# Patient Record
Sex: MALE | Race: WHITE | HISPANIC OR LATINO | Employment: FULL TIME | URBAN - METROPOLITAN AREA
[De-identification: names, ages, dates, MRNs, and addresses within clinical notes are randomized per-mention and may not be internally consistent; named-entity substitution may affect disease eponyms.]

---

## 2020-03-19 ENCOUNTER — TELEPHONE (OUTPATIENT)
Dept: SCHEDULING | Facility: IMAGING CENTER | Age: 50
End: 2020-03-19

## 2020-03-20 ENCOUNTER — OFFICE VISIT (OUTPATIENT)
Dept: MEDICAL GROUP | Facility: PHYSICIAN GROUP | Age: 50
End: 2020-03-20
Payer: COMMERCIAL

## 2020-03-20 VITALS
BODY MASS INDEX: 33.6 KG/M2 | HEART RATE: 88 BPM | SYSTOLIC BLOOD PRESSURE: 130 MMHG | RESPIRATION RATE: 16 BRPM | DIASTOLIC BLOOD PRESSURE: 82 MMHG | TEMPERATURE: 98.1 F | HEIGHT: 64 IN | WEIGHT: 196.8 LBS | OXYGEN SATURATION: 95 %

## 2020-03-20 DIAGNOSIS — E66.09 CLASS 1 OBESITY DUE TO EXCESS CALORIES WITHOUT SERIOUS COMORBIDITY WITH BODY MASS INDEX (BMI) OF 33.0 TO 33.9 IN ADULT: ICD-10-CM

## 2020-03-20 DIAGNOSIS — G89.29 CHRONIC RIGHT SHOULDER PAIN: ICD-10-CM

## 2020-03-20 DIAGNOSIS — L91.8 SKIN TAGS, MULTIPLE ACQUIRED: ICD-10-CM

## 2020-03-20 DIAGNOSIS — M25.511 CHRONIC RIGHT SHOULDER PAIN: ICD-10-CM

## 2020-03-20 PROBLEM — E66.811 CLASS 1 OBESITY DUE TO EXCESS CALORIES WITHOUT SERIOUS COMORBIDITY IN ADULT: Status: ACTIVE | Noted: 2020-03-20

## 2020-03-20 PROCEDURE — 99204 OFFICE O/P NEW MOD 45 MIN: CPT | Performed by: FAMILY MEDICINE

## 2020-03-20 RX ORDER — MELOXICAM 7.5 MG/1
7.5 TABLET ORAL DAILY
Qty: 30 TAB | Refills: 0 | Status: SHIPPED | OUTPATIENT
Start: 2020-03-20 | End: 2020-06-29

## 2020-03-20 ASSESSMENT — PATIENT HEALTH QUESTIONNAIRE - PHQ9: CLINICAL INTERPRETATION OF PHQ2 SCORE: 0

## 2020-03-20 NOTE — ASSESSMENT & PLAN NOTE
This is a chronic problem. The patient has a BMI of 33. Patient is aware of this problem. Lifestyle modification has not been embraced. The patient's diet is improving. The patient is not active.

## 2020-03-20 NOTE — ASSESSMENT & PLAN NOTE
This is a chronic problem.  The patient has developed multiple skin tags around the neck. These are bothersome to him and would like them removed.

## 2020-03-20 NOTE — PROGRESS NOTES
Subjective:     CC:  Diagnoses of Class 1 obesity due to excess calories without serious comorbidity with body mass index (BMI) of 33.0 to 33.9 in adult, Skin tags, multiple acquired, and Chronic right shoulder pain were pertinent to this visit.    HISTORY OF THE PRESENT ILLNESS: Patient is a 49 y.o. male. This pleasant patient is here today to establish care and discuss the following problems.   Prior PCP: Unknown.    Class 1 obesity due to excess calories without serious comorbidity in adult  This is a chronic problem. The patient has a BMI of 33. Patient is aware of this problem. Lifestyle modification has not been embraced. The patient's diet is improving. The patient is not active.     Skin tags, multiple acquired  This is a chronic problem.  The patient has developed multiple skin tags around the neck. These are bothersome to him and would like them removed.    Chronic right shoulder pain  This is a chronic problem  The patient has been having right shoulder pain for the past 1 year. Occurred after doing cross fit and lifting weights.   Pain is rated  8/10      Allergies: Patient has no known allergies.    Current Outpatient Medications Ordered in Epic   Medication Sig Dispense Refill   • meloxicam (MOBIC) 7.5 MG Tab Take 1 Tab by mouth every day. 30 Tab 0     No current Epic-ordered facility-administered medications on file.        Past Medical History:   Diagnosis Date   • Obesity        History reviewed. No pertinent surgical history.    Social History     Tobacco Use   • Smoking status: Never Smoker   • Smokeless tobacco: Never Used   Substance Use Topics   • Alcohol use: Not Currently   • Drug use: Never       Social History     Social History Narrative   • Not on file       Family History   Problem Relation Age of Onset   • Heart Disease Mother    • Hyperlipidemia Father    • Diabetes Father        Health Maintenance: Completed    ROS:   Gen: no fevers/chills, no changes in weight  Eyes: no changes in  "vision  ENT: no sore throat, no hearing loss, no bloody nose  Pulm: no sob, no cough  CV: no chest pain, no palpitations  GI: no nausea/vomiting, no diarrhea  : no dysuria  MSk: no myalgias  Skin: no rash  Neuro: no headaches, no numbness/tingling      Objective:     Exam: /82 (BP Location: Left arm, Patient Position: Sitting, BP Cuff Size: Adult)   Pulse 88   Temp 36.7 °C (98.1 °F) (Temporal)   Resp 16   Ht 1.626 m (5' 4\")   Wt 89.3 kg (196 lb 12.8 oz)   SpO2 95%  Body mass index is 33.78 kg/m².    General: Normal appearing. No distress.  HENT: Normocephalic. Ears normal shape and contour, canals are clear bilaterally, tympanic membranes are benign, nasal mucosa benign, oropharynx is without erythema, edema or exudates.   Eyes: Conjunctiva clear lids without ptosis, pupils equal and reactive to light accommodation.  Neck: Supple without JVD. Thyroid is not enlarged.  Pulmonary: Clear to ausculation.  Normal effort. No rales, ronchi, or wheezing.  Cardiovascular: Regular rate and rhythm without murmur. Radial pulses are intact and equal bilaterally.  Abdomen: Soft, nontender, nondistended. Normal bowel sounds. Liver and spleen are not palpable  Neurologic: Moves all extremities  Lymph: No cervical or supraclavicular lymph nodes are palpable  Skin: Warm and dry.  No obvious lesions.  Musculoskeletal: Normal gait. No extremity cyanosis, clubbing, or edema.  Right shoulder examination had a negative Howard test, negative empty can test, negative takeoff test.  Range of motion is within normal limits.  No pain is elicited upon shoulder extension.    Psych: Normal mood and affect. Alert and oriented x3.     Assessment & Plan:   49 y.o. male with the following -    1. Class 1 obesity due to excess calories without serious comorbidity with body mass index (BMI) of 33.0 to 33.9 in adult  This is a chronic, unchanged condition.  The patient is noted to have class I obesity with a BMI of 33.  He has not " reinforced lifestyle modification lately.  He has enjoyed CrossFit in the past.  I would like to risk stratify him by ordering baseline labs today.  - CBC WITHOUT DIFFERENTIAL; Future  - Comp Metabolic Panel; Future  - HEMOGLOBIN A1C; Future  - Lipid Profile; Future  - TSH WITH REFLEX TO FT4; Future    2. Skin tags, multiple acquired  There is a chronic, unchanged condition.  The patient is noting multiple skin tags around his neck.  He would like to get these removed.  I will send him to dermatology for removal.  - REFERRAL TO DERMATOLOGY    3. Chronic right shoulder pain  There is a chronic, stable condition.  The patient has been experiencing right shoulder pain of 1 year duration.  Physical exam is benign.  I suspect osteoarthritis.  I will do a trial of meloxicam along with physical therapy.  - REFERRAL TO PHYSICAL THERAPY Reason for Therapy: Eval/Treat/Report  - meloxicam (MOBIC) 7.5 MG Tab; Take 1 Tab by mouth every day.  Dispense: 30 Tab; Refill: 0      Return in about 6 months (around 9/20/2020) for FU on labs/weight.    Please note that this dictation was created using voice recognition software. I have made every reasonable attempt to correct obvious errors, but I expect that there are errors of grammar and possibly content that I did not discover before finalizing the note.

## 2020-03-20 NOTE — ASSESSMENT & PLAN NOTE
This is a chronic problem  The patient has been having right shoulder pain for the past 1 year. Occurred after doing cross fit and lifting weights.   Pain is rated  8/10

## 2020-03-21 ENCOUNTER — HOSPITAL ENCOUNTER (OUTPATIENT)
Dept: LAB | Facility: MEDICAL CENTER | Age: 50
End: 2020-03-21
Attending: FAMILY MEDICINE
Payer: COMMERCIAL

## 2020-03-21 DIAGNOSIS — E66.09 CLASS 1 OBESITY DUE TO EXCESS CALORIES WITHOUT SERIOUS COMORBIDITY WITH BODY MASS INDEX (BMI) OF 33.0 TO 33.9 IN ADULT: ICD-10-CM

## 2020-03-21 LAB
ALBUMIN SERPL BCP-MCNC: 4.4 G/DL (ref 3.2–4.9)
ALBUMIN/GLOB SERPL: 1.5 G/DL
ALP SERPL-CCNC: 90 U/L (ref 30–99)
ALT SERPL-CCNC: 42 U/L (ref 2–50)
ANION GAP SERPL CALC-SCNC: 11 MMOL/L (ref 7–16)
AST SERPL-CCNC: 24 U/L (ref 12–45)
BILIRUB SERPL-MCNC: 0.5 MG/DL (ref 0.1–1.5)
BUN SERPL-MCNC: 14 MG/DL (ref 8–22)
CALCIUM SERPL-MCNC: 9.4 MG/DL (ref 8.5–10.5)
CHLORIDE SERPL-SCNC: 103 MMOL/L (ref 96–112)
CHOLEST SERPL-MCNC: 213 MG/DL (ref 100–199)
CO2 SERPL-SCNC: 25 MMOL/L (ref 20–33)
CREAT SERPL-MCNC: 0.82 MG/DL (ref 0.5–1.4)
ERYTHROCYTE [DISTWIDTH] IN BLOOD BY AUTOMATED COUNT: 44.5 FL (ref 35.9–50)
EST. AVERAGE GLUCOSE BLD GHB EST-MCNC: 120 MG/DL
FASTING STATUS PATIENT QL REPORTED: NORMAL
GLOBULIN SER CALC-MCNC: 3 G/DL (ref 1.9–3.5)
GLUCOSE SERPL-MCNC: 94 MG/DL (ref 65–99)
HBA1C MFR BLD: 5.8 % (ref 0–5.6)
HCT VFR BLD AUTO: 53.2 % (ref 42–52)
HDLC SERPL-MCNC: 35 MG/DL
HGB BLD-MCNC: 17.7 G/DL (ref 14–18)
LDLC SERPL CALC-MCNC: 140 MG/DL
MCH RBC QN AUTO: 28.8 PG (ref 27–33)
MCHC RBC AUTO-ENTMCNC: 33.3 G/DL (ref 33.7–35.3)
MCV RBC AUTO: 86.6 FL (ref 81.4–97.8)
PLATELET # BLD AUTO: 326 K/UL (ref 164–446)
PMV BLD AUTO: 9.8 FL (ref 9–12.9)
POTASSIUM SERPL-SCNC: 4.1 MMOL/L (ref 3.6–5.5)
PROT SERPL-MCNC: 7.4 G/DL (ref 6–8.2)
RBC # BLD AUTO: 6.14 M/UL (ref 4.7–6.1)
SODIUM SERPL-SCNC: 139 MMOL/L (ref 135–145)
TRIGL SERPL-MCNC: 190 MG/DL (ref 0–149)
TSH SERPL DL<=0.005 MIU/L-ACNC: 1.45 UIU/ML (ref 0.38–5.33)
WBC # BLD AUTO: 9.5 K/UL (ref 4.8–10.8)

## 2020-03-21 PROCEDURE — 85027 COMPLETE CBC AUTOMATED: CPT

## 2020-03-21 PROCEDURE — 80061 LIPID PANEL: CPT

## 2020-03-21 PROCEDURE — 36415 COLL VENOUS BLD VENIPUNCTURE: CPT

## 2020-03-21 PROCEDURE — 83036 HEMOGLOBIN GLYCOSYLATED A1C: CPT

## 2020-03-21 PROCEDURE — 80053 COMPREHEN METABOLIC PANEL: CPT

## 2020-03-21 PROCEDURE — 84443 ASSAY THYROID STIM HORMONE: CPT

## 2020-03-30 ENCOUNTER — APPOINTMENT (OUTPATIENT)
Dept: PHYSICAL THERAPY | Facility: REHABILITATION | Age: 50
End: 2020-03-30
Attending: FAMILY MEDICINE
Payer: COMMERCIAL

## 2020-06-29 ENCOUNTER — APPOINTMENT (OUTPATIENT)
Dept: RADIOLOGY | Facility: IMAGING CENTER | Age: 50
End: 2020-06-29
Attending: FAMILY MEDICINE
Payer: COMMERCIAL

## 2020-06-29 ENCOUNTER — OFFICE VISIT (OUTPATIENT)
Dept: URGENT CARE | Facility: CLINIC | Age: 50
End: 2020-06-29
Payer: COMMERCIAL

## 2020-06-29 VITALS
HEIGHT: 64 IN | HEART RATE: 78 BPM | BODY MASS INDEX: 33.8 KG/M2 | SYSTOLIC BLOOD PRESSURE: 120 MMHG | RESPIRATION RATE: 20 BRPM | OXYGEN SATURATION: 93 % | TEMPERATURE: 98 F | WEIGHT: 198 LBS | DIASTOLIC BLOOD PRESSURE: 84 MMHG

## 2020-06-29 DIAGNOSIS — S20.219A CONTUSION OF CHEST WALL, UNSPECIFIED LATERALITY, INITIAL ENCOUNTER: ICD-10-CM

## 2020-06-29 DIAGNOSIS — T07.XXXA MULTIPLE ABRASIONS: ICD-10-CM
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
## 2020-06-29 PROCEDURE — 99214 OFFICE O/P EST MOD 30 MIN: CPT | Mod: 25 | Performed by: FAMILY MEDICINE

## 2020-06-29 PROCEDURE — 71101 X-RAY EXAM UNILAT RIBS/CHEST: CPT | Mod: TC,LT | Performed by: FAMILY MEDICINE

## 2020-06-29 PROCEDURE — 90715 TDAP VACCINE 7 YRS/> IM: CPT | Performed by: FAMILY MEDICINE

## 2020-06-29 PROCEDURE — 90471 IMMUNIZATION ADMIN: CPT | Performed by: FAMILY MEDICINE

## 2020-06-29 SDOH — SOCIAL STABILITY: SOCIAL NETWORK
DO YOU BELONG TO ANY CLUBS OR ORGANIZATIONS SUCH AS CHURCH GROUPS UNIONS, FRATERNAL OR ATHLETIC GROUPS, OR SCHOOL GROUPS?: YES

## 2020-06-29 SDOH — ECONOMIC STABILITY: INCOME INSECURITY: HOW HARD IS IT FOR YOU TO PAY FOR THE VERY BASICS LIKE FOOD, HOUSING, MEDICAL CARE, AND HEATING?: NOT VERY HARD

## 2020-06-29 SDOH — SOCIAL STABILITY: SOCIAL NETWORK: HOW OFTEN DO YOU ATTENT MEETINGS OF THE CLUB OR ORGANIZATION YOU BELONG TO?: MORE THAN 4 TIMES PER YEAR

## 2020-06-29 SDOH — ECONOMIC STABILITY: TRANSPORTATION INSECURITY
IN THE PAST 12 MONTHS, HAS LACK OF TRANSPORTATION KEPT YOU FROM MEETINGS, WORK, OR FROM GETTING THINGS NEEDED FOR DAILY LIVING?: NO

## 2020-06-29 SDOH — HEALTH STABILITY: MENTAL HEALTH: HOW OFTEN DO YOU HAVE A DRINK CONTAINING ALCOHOL?: NEVER

## 2020-06-29 SDOH — ECONOMIC STABILITY: FOOD INSECURITY: WITHIN THE PAST 12 MONTHS, YOU WORRIED THAT YOUR FOOD WOULD RUN OUT BEFORE YOU GOT MONEY TO BUY MORE.: NEVER TRUE

## 2020-06-29 SDOH — SOCIAL STABILITY: SOCIAL NETWORK: ARE YOU MARRIED, WIDOWED, DIVORCED, SEPARATED, NEVER MARRIED, OR LIVING WITH A PARTNER?: MARRIED

## 2020-06-29 SDOH — HEALTH STABILITY: PHYSICAL HEALTH: ON AVERAGE, HOW MANY DAYS PER WEEK DO YOU ENGAGE IN MODERATE TO STRENUOUS EXERCISE (LIKE A BRISK WALK)?: 2 DAYS

## 2020-06-29 SDOH — HEALTH STABILITY: MENTAL HEALTH: HOW MANY STANDARD DRINKS CONTAINING ALCOHOL DO YOU HAVE ON A TYPICAL DAY?: PATIENT DECLINED

## 2020-06-29 SDOH — HEALTH STABILITY: PHYSICAL HEALTH: ON AVERAGE, HOW MANY MINUTES DO YOU ENGAGE IN EXERCISE AT THIS LEVEL?: 60 MIN

## 2020-06-29 SDOH — SOCIAL STABILITY: SOCIAL NETWORK: HOW OFTEN DO YOU ATTEND CHURCH OR RELIGIOUS SERVICES?: MORE THAN 4 TIMES PER YEAR

## 2020-06-29 SDOH — SOCIAL STABILITY: SOCIAL NETWORK
IN A TYPICAL WEEK, HOW MANY TIMES DO YOU TALK ON THE PHONE WITH FAMILY, FRIENDS, OR NEIGHBORS?: MORE THAN THREE TIMES A WEEK

## 2020-06-29 SDOH — HEALTH STABILITY: MENTAL HEALTH: HOW OFTEN DO YOU HAVE 6 OR MORE DRINKS ON ONE OCCASION?: NEVER

## 2020-06-29 SDOH — HEALTH STABILITY: MENTAL HEALTH
STRESS IS WHEN SOMEONE FEELS TENSE, NERVOUS, ANXIOUS, OR CAN'T SLEEP AT NIGHT BECAUSE THEIR MIND IS TROUBLED. HOW STRESSED ARE YOU?: NOT AT ALL

## 2020-06-29 SDOH — ECONOMIC STABILITY: FOOD INSECURITY: WITHIN THE PAST 12 MONTHS, THE FOOD YOU BOUGHT JUST DIDN'T LAST AND YOU DIDN'T HAVE MONEY TO GET MORE.: NEVER TRUE

## 2020-06-29 SDOH — ECONOMIC STABILITY: TRANSPORTATION INSECURITY
IN THE PAST 12 MONTHS, HAS THE LACK OF TRANSPORTATION KEPT YOU FROM MEDICAL APPOINTMENTS OR FROM GETTING MEDICATIONS?: NO

## 2020-06-29 SDOH — SOCIAL STABILITY: SOCIAL NETWORK: HOW OFTEN DO YOU GET TOGETHER WITH FRIENDS OR RELATIVES?: ONCE A WEEK

## 2020-06-29 ASSESSMENT — FIBROSIS 4 INDEX: FIB4 SCORE: 0.58
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
## 2020-09-21 ENCOUNTER — OFFICE VISIT (OUTPATIENT)
Dept: MEDICAL GROUP | Facility: PHYSICIAN GROUP | Age: 50
End: 2020-09-21
Payer: COMMERCIAL

## 2020-09-21 VITALS
HEART RATE: 72 BPM | DIASTOLIC BLOOD PRESSURE: 96 MMHG | WEIGHT: 200.8 LBS | BODY MASS INDEX: 34.28 KG/M2 | RESPIRATION RATE: 16 BRPM | TEMPERATURE: 97.7 F | HEIGHT: 64 IN | OXYGEN SATURATION: 97 % | SYSTOLIC BLOOD PRESSURE: 134 MMHG

## 2020-09-21 DIAGNOSIS — E66.09 CLASS 1 OBESITY DUE TO EXCESS CALORIES WITHOUT SERIOUS COMORBIDITY IN ADULT, UNSPECIFIED BMI: ICD-10-CM

## 2020-09-21 DIAGNOSIS — E78.2 MIXED HYPERLIPIDEMIA: ICD-10-CM

## 2020-09-21 DIAGNOSIS — R73.03 PREDIABETES: ICD-10-CM

## 2020-09-21 DIAGNOSIS — Z23 NEED FOR VACCINATION: ICD-10-CM

## 2020-09-21 PROCEDURE — 99214 OFFICE O/P EST MOD 30 MIN: CPT | Mod: 25 | Performed by: FAMILY MEDICINE

## 2020-09-21 PROCEDURE — 90471 IMMUNIZATION ADMIN: CPT | Performed by: FAMILY MEDICINE

## 2020-09-21 PROCEDURE — 90686 IIV4 VACC NO PRSV 0.5 ML IM: CPT | Performed by: FAMILY MEDICINE

## 2020-09-21 ASSESSMENT — FIBROSIS 4 INDEX: FIB4 SCORE: 0.58

## 2020-09-21 NOTE — ASSESSMENT & PLAN NOTE
This is a chronic problem. The patient has a BMI of 34. Patient is aware of this problem. Lifestyle modification has not been embraced. Gained 4lbs since 3/20. Has been more sedentary due to the pandemic. The patient's diet is low on meats but has high content in rice and potatoes.

## 2020-09-21 NOTE — ASSESSMENT & PLAN NOTE
This is a chronic problem  The patient was found to have elevated cholesterol values 3/20  The 10-year ASCVD risk score (Tim MARCIAL Jr., et al., 2013) is: 6.5%

## 2020-09-21 NOTE — PROGRESS NOTES
"Subjective:     Chief Complaint   Patient presents with   • Follow-Up       HPI:   Curly presents today to discuss the following.    Class 1 obesity due to excess calories without serious comorbidity in adult  This is a chronic problem. The patient has a BMI of 34. Patient is aware of this problem. Lifestyle modification has not been embraced. Gained 4lbs since 3/20. Has been more sedentary due to the pandemic. The patient's diet is low on meats but has high content in rice and potatoes.     Prediabetes  This is a chronic issue  The patient was found to have A1c of 5.8 back in 3/20  He would like to hold off until next visit so he can lose the weight.     Mixed hyperlipidemia  This is a chronic problem  The patient was found to have elevated cholesterol values 3/20  The 10-year ASCVD risk score ([place] ANIKET Jr., et al., 2013) is: 6.5%        Past Medical History:   Diagnosis Date   • Obesity        Social History     Tobacco Use   • Smoking status: Never Smoker   • Smokeless tobacco: Never Used   Substance Use Topics   • Alcohol use: Not Currently     Frequency: Never     Drinks per session: Patient refused     Binge frequency: Never   • Drug use: Never       No current [facility]-ordered outpatient medications on file.     No current [facility]-ordered facility-administered medications on file.        Allergies:  Patient has no known allergies.    Health Maintenance: Completed    ROS:  Gen: no fevers/chills, no changes in weight  Eyes: no changes in vision  ENT: no sore throat, no hearing loss, no bloody nose  Pulm: no sob, no cough  CV: no chest pain, no palpitations  GI: no nausea/vomiting, no diarrhea      Objective:     Exam:  /96 (BP Location: Left arm, Patient Position: Sitting, BP Cuff Size: Adult)   Pulse 72   Temp 36.5 °C (97.7 °F) (Temporal)   Resp 16   Ht 1.626 m (5' 4\")   Wt 91.1 kg (200 lb 12.8 oz)   SpO2 97%   BMI 34.47 kg/m²  Body mass index is 34.47 kg/m².    Constitutional: Alert, no distress, " well-groomed.  Skin: Warm, dry, good turgor, no rashes in visible areas.  Eye: Equal, round and reactive, conjunctiva clear, lids normal.  ENMT: Lips without lesions, good dentition, moist mucous membranes.  Neck: Trachea midline, no masses, no thyromegaly.  Respiratory: Unlabored respiratory effort, no cough.  MSK: Normal gait, moves all extremities.  Neuro: Grossly non-focal.   Psych: Alert and oriented x3, normal affect and mood.      Assessment & Plan:     49 y.o. male with the following -     1. Class 1 obesity due to excess calories without serious comorbidity in adult, unspecified BMI  This is a chronic condition, worsening.  Patient is noted to be worsening in terms of his condition and has gained approximately 4 pounds since March.  He attributes this to the sedentary lifestyle that he has had due to the pandemic.  I have reinforced lifestyle change today he is amenable to working harder on it.  We will reassess him in 6 months.    2. Prediabetes  This is a chronic condition.  The patient has a history of prediabetes last checked March 2020 with an A1c of 5.8.  I plan on reinforcing lifestyle change and checking again by March 2021.  - HEMOGLOBIN A1C; Future  - Lipid Profile; Future    3. Mixed hyperlipidemia  This is a chronic condition.  ASCVD score is 6.8%.  He will continue to reinforce lifestyle change.  No statin is indicated at this time.  - HEMOGLOBIN A1C; Future  - Lipid Profile; Future    4. Need for vaccination  - Influenza Vaccine Quad Injection (PF)      Return in about 6 months (around 3/21/2021).    Please note that this dictation was created using voice recognition software. I have made every reasonable attempt to correct obvious errors, but I expect that there are errors of grammar and possibly content that I did not discover before finalizing the note.

## 2020-09-21 NOTE — ASSESSMENT & PLAN NOTE
This is a chronic issue  The patient was found to have A1c of 5.8 back in 3/20  He would like to hold off until next visit so he can lose the weight.

## 2021-03-22 ENCOUNTER — APPOINTMENT (OUTPATIENT)
Dept: MEDICAL GROUP | Facility: PHYSICIAN GROUP | Age: 51
End: 2021-03-22
Payer: COMMERCIAL

## 2022-08-10 ENCOUNTER — OFFICE VISIT (OUTPATIENT)
Dept: MEDICAL GROUP | Facility: PHYSICIAN GROUP | Age: 52
End: 2022-08-10
Payer: COMMERCIAL

## 2022-08-10 VITALS
HEIGHT: 65 IN | BODY MASS INDEX: 32.99 KG/M2 | RESPIRATION RATE: 14 BRPM | DIASTOLIC BLOOD PRESSURE: 70 MMHG | WEIGHT: 198 LBS | SYSTOLIC BLOOD PRESSURE: 120 MMHG | OXYGEN SATURATION: 96 % | TEMPERATURE: 97.7 F | HEART RATE: 75 BPM

## 2022-08-10 DIAGNOSIS — Z13.21 ENCOUNTER FOR VITAMIN DEFICIENCY SCREENING: ICD-10-CM

## 2022-08-10 DIAGNOSIS — R73.03 PREDIABETES: ICD-10-CM

## 2022-08-10 DIAGNOSIS — E66.09 CLASS 1 OBESITY DUE TO EXCESS CALORIES WITHOUT SERIOUS COMORBIDITY IN ADULT, UNSPECIFIED BMI: ICD-10-CM

## 2022-08-10 DIAGNOSIS — E78.2 MIXED HYPERLIPIDEMIA: ICD-10-CM

## 2022-08-10 DIAGNOSIS — Z13.29 SCREENING FOR THYROID DISORDER: ICD-10-CM

## 2022-08-10 DIAGNOSIS — R03.0 ELEVATED BLOOD PRESSURE READING: ICD-10-CM

## 2022-08-10 DIAGNOSIS — E55.9 VITAMIN D DEFICIENCY: ICD-10-CM

## 2022-08-10 PROCEDURE — 99214 OFFICE O/P EST MOD 30 MIN: CPT | Performed by: INTERNAL MEDICINE

## 2022-08-10 ASSESSMENT — PATIENT HEALTH QUESTIONNAIRE - PHQ9: CLINICAL INTERPRETATION OF PHQ2 SCORE: 0

## 2022-08-10 NOTE — PROGRESS NOTES
"New Patient to establish    Chief Complaint   Patient presents with    [facility]    Requesting Labs       Subjective:     History of Present Illness: Patient is a 51 y.o. male who is here today to establish primary care    No current outpatient medications on file prior to visit.     No current facility-administered medications on file prior to visit.     No Known Allergies  Patient Active Problem List    Diagnosis Date Noted    Vitamin D deficiency 08/10/2022    Prediabetes 09/21/2020    Mixed hyperlipidemia 09/21/2020    Class 1 obesity due to excess calories without serious comorbidity in adult 03/20/2020    Skin tags, multiple acquired 03/20/2020    Chronic right shoulder pain 03/20/2020     Past Medical History:   Diagnosis Date    Obesity      No past surgical history on file.  Family History   Problem Relation Age of Onset    Heart Disease Mother     Hyperlipidemia Father     Diabetes Father      Social History     Tobacco Use    Smoking status: Never    Smokeless tobacco: Never   Vaping Use    Vaping Use: Never used   Substance Use Topics    Alcohol use: Not Currently    Drug use: Never       ROS:  All other systems reviewed and are negative except as stated in the HPI       Physical Exam:     /70 (BP Location: Left arm, Patient Position: Sitting, BP Cuff Size: Adult)   Pulse 75   Temp 36.5 °C (97.7 °F) (Temporal)   Resp 14   Ht 1.651 m (5' 5\")   Wt 89.8 kg (198 lb)   SpO2 96%   BMI 32.95 kg/m²   General: Normal appearing. No distress.  Pulmonary: Clear to ausculation.  Normal effort.   Cardiovascular: Regular rate and rhythm    Assessment and Plan:     1. Class 1 obesity due to excess calories without serious comorbidity in adult, unspecified BMI  Patient try to lose weight with more keto diet and time restricted feeding practice, reported achieve some weight loss as well, would like to be around 180 pounds  - Comp Metabolic Panel; Future  - FREE THYROXINE; Future  - T3 FREE; Future  - " THYROID PEROXIDASE  (TPO) AB; Future  - TSH; Future    2. Prediabetes  - HEMOGLOBIN A1C; Future    3. Mixed hyperlipidemia  - LipoFit by NMR; Future    4. Screening for thyroid disorder  - FREE THYROXINE; Future  - T3 FREE; Future  - THYROID PEROXIDASE  (TPO) AB; Future  - TSH; Future    5. Encounter for vitamin deficiency screening  - VITAMIN B12; Future    6. Vitamin D deficiency  - VITAMIN D,25 HYDROXY; Future    Elevated blood pressure reading  - Reported checking blood pressure at home with 1 reading of 150s over 90s, does not check blood pressure on regular basis, denies related symptoms currently    -discussion in details on target blood pressure goal, advised monitoring BP closely at home.  Have BP log to present at follow-up visit or send through my chart.   -Advised low-salt diet, healthy dietary option include plenty of vegetable, reduce refine carbohydrates and sugar, regular exercise as tolerated, healthy fat/protein/carbs, also avoid alcohol, no NSAIDs  If symptoms worsen or persist patient can return to clinic for reevaluation.  Red flags and strict emergency room precautions discussed.  Discussed side effects of medication. Patient understand    Follow Up:      Return in about 4 weeks (around 9/7/2022) for labs.    Please note that this dictation was created using voice recognition software. I have made every reasonable attempt to correct obvious errors, but I expect that there are errors of grammar and possibly content that I did not discover before finalizing the note.    Signed by: Vickey Hurd M.D.

## 2022-08-31 ENCOUNTER — HOSPITAL ENCOUNTER (OUTPATIENT)
Dept: LAB | Facility: MEDICAL CENTER | Age: 52
End: 2022-08-31
Attending: INTERNAL MEDICINE
Payer: COMMERCIAL

## 2022-08-31 DIAGNOSIS — R73.03 PREDIABETES: ICD-10-CM

## 2022-08-31 DIAGNOSIS — E78.2 MIXED HYPERLIPIDEMIA: ICD-10-CM

## 2022-08-31 DIAGNOSIS — Z13.29 SCREENING FOR THYROID DISORDER: ICD-10-CM

## 2022-08-31 DIAGNOSIS — E66.09 CLASS 1 OBESITY DUE TO EXCESS CALORIES WITHOUT SERIOUS COMORBIDITY IN ADULT, UNSPECIFIED BMI: ICD-10-CM

## 2022-08-31 DIAGNOSIS — E55.9 VITAMIN D DEFICIENCY: ICD-10-CM

## 2022-08-31 DIAGNOSIS — Z13.21 ENCOUNTER FOR VITAMIN DEFICIENCY SCREENING: ICD-10-CM

## 2022-08-31 LAB
25(OH)D3 SERPL-MCNC: 18 NG/ML (ref 30–100)
EST. AVERAGE GLUCOSE BLD GHB EST-MCNC: 123 MG/DL
HBA1C MFR BLD: 5.9 % (ref 4–5.6)
T3FREE SERPL-MCNC: 3.21 PG/ML (ref 2–4.4)
T4 FREE SERPL-MCNC: 0.91 NG/DL (ref 0.93–1.7)
THYROPEROXIDASE AB SERPL-ACNC: <9 IU/ML (ref 0–9)
TSH SERPL DL<=0.005 MIU/L-ACNC: 2.1 UIU/ML (ref 0.38–5.33)
VIT B12 SERPL-MCNC: 587 PG/ML (ref 211–911)

## 2022-08-31 PROCEDURE — 82306 VITAMIN D 25 HYDROXY: CPT

## 2022-08-31 PROCEDURE — 83704 LIPOPROTEIN BLD QUAN PART: CPT

## 2022-08-31 PROCEDURE — 86376 MICROSOMAL ANTIBODY EACH: CPT

## 2022-08-31 PROCEDURE — 82607 VITAMIN B-12: CPT

## 2022-08-31 PROCEDURE — 80061 LIPID PANEL: CPT

## 2022-08-31 PROCEDURE — 83036 HEMOGLOBIN GLYCOSYLATED A1C: CPT

## 2022-08-31 PROCEDURE — 84439 ASSAY OF FREE THYROXINE: CPT

## 2022-08-31 PROCEDURE — 84481 FREE ASSAY (FT-3): CPT

## 2022-08-31 PROCEDURE — 80053 COMPREHEN METABOLIC PANEL: CPT

## 2022-08-31 PROCEDURE — 36415 COLL VENOUS BLD VENIPUNCTURE: CPT

## 2022-08-31 PROCEDURE — 84443 ASSAY THYROID STIM HORMONE: CPT

## 2022-09-01 LAB
ALBUMIN SERPL BCP-MCNC: 4.6 G/DL (ref 3.2–4.9)
ALBUMIN/GLOB SERPL: 1.6 G/DL
ALP SERPL-CCNC: 85 U/L (ref 30–99)
ALT SERPL-CCNC: 24 U/L (ref 2–50)
ANION GAP SERPL CALC-SCNC: 12 MMOL/L (ref 7–16)
AST SERPL-CCNC: 19 U/L (ref 12–45)
BILIRUB SERPL-MCNC: 0.6 MG/DL (ref 0.1–1.5)
BUN SERPL-MCNC: 13 MG/DL (ref 8–22)
CALCIUM SERPL-MCNC: 9.2 MG/DL (ref 8.5–10.5)
CHLORIDE SERPL-SCNC: 103 MMOL/L (ref 96–112)
CO2 SERPL-SCNC: 23 MMOL/L (ref 20–33)
CREAT SERPL-MCNC: 0.84 MG/DL (ref 0.5–1.4)
GFR SERPLBLD CREATININE-BSD FMLA CKD-EPI: 104 ML/MIN/1.73 M 2
GLOBULIN SER CALC-MCNC: 2.8 G/DL (ref 1.9–3.5)
GLUCOSE SERPL-MCNC: 81 MG/DL (ref 65–99)
POTASSIUM SERPL-SCNC: 3.9 MMOL/L (ref 3.6–5.5)
PROT SERPL-MCNC: 7.4 G/DL (ref 6–8.2)
SODIUM SERPL-SCNC: 138 MMOL/L (ref 135–145)

## 2022-09-04 LAB
CHOLEST SERPL-MCNC: 234 MG/DL
HDL PARTICAL NO Q4363: 28.4 UMOL/L
HDL SIZE Q4361: 8.2 NM
HDLC SERPL-MCNC: 33 MG/DL (ref 40–59)
HLD.LARGE SERPL-SCNC: <2.8 UMOL/L
L VLDL PART NO Q4357: 8.4 NMOL/L
LDL SERPL QN: 19.8 NM
LDL SERPL-SCNC: 2366 NMOL/L
LDL SMALL SERPL-SCNC: >1085 NMOL/L
LDLC SERPL CALC-MCNC: 151 MG/DL
PATHOLOGY STUDY: ABNORMAL
TRIGL SERPL-MCNC: 251 MG/DL (ref 30–149)
VLDL SIZE Q4362: 53.6 NM

## 2022-10-02 ENCOUNTER — HOSPITAL ENCOUNTER (OUTPATIENT)
Facility: MEDICAL CENTER | Age: 52
End: 2022-10-02
Attending: FAMILY MEDICINE
Payer: COMMERCIAL

## 2022-10-02 ENCOUNTER — OFFICE VISIT (OUTPATIENT)
Dept: URGENT CARE | Facility: PHYSICIAN GROUP | Age: 52
End: 2022-10-02
Payer: COMMERCIAL

## 2022-10-02 VITALS
HEART RATE: 85 BPM | SYSTOLIC BLOOD PRESSURE: 132 MMHG | WEIGHT: 197 LBS | HEIGHT: 65 IN | DIASTOLIC BLOOD PRESSURE: 88 MMHG | OXYGEN SATURATION: 96 % | TEMPERATURE: 98.8 F | BODY MASS INDEX: 32.82 KG/M2 | RESPIRATION RATE: 20 BRPM

## 2022-10-02 DIAGNOSIS — J02.9 SORE THROAT: ICD-10-CM

## 2022-10-02 DIAGNOSIS — Z03.818 ENCOUNTER FOR PATIENT CONCERN ABOUT EXPOSURE TO INFECTIOUS ORGANISM: ICD-10-CM

## 2022-10-02 LAB
COVID ORDER STATUS COVID19: NORMAL
INT CON NEG: NORMAL
INT CON POS: NORMAL
S PYO AG THROAT QL: NEGATIVE

## 2022-10-02 PROCEDURE — U0003 INFECTIOUS AGENT DETECTION BY NUCLEIC ACID (DNA OR RNA); SEVERE ACUTE RESPIRATORY SYNDROME CORONAVIRUS 2 (SARS-COV-2) (CORONAVIRUS DISEASE [COVID-19]), AMPLIFIED PROBE TECHNIQUE, MAKING USE OF HIGH THROUGHPUT TECHNOLOGIES AS DESCRIBED BY CMS-2020-01-R: HCPCS

## 2022-10-02 PROCEDURE — 87880 STREP A ASSAY W/OPTIC: CPT | Performed by: FAMILY MEDICINE

## 2022-10-02 PROCEDURE — U0005 INFEC AGEN DETEC AMPLI PROBE: HCPCS

## 2022-10-02 PROCEDURE — 99213 OFFICE O/P EST LOW 20 MIN: CPT | Performed by: FAMILY MEDICINE

## 2022-10-02 NOTE — LETTER
October 2, 2022    To Whom It May Concern:         This is confirmation that Curly Cohenshruthi attended his scheduled appointment with Vesta Paredes M.D. on 10/02/22. COVID testing is in progress. Please allow up to 72 hours for results.         If you have any questions please do not hesitate to call me at the phone number listed below.    Sincerely,          Vesta Paredes M.D.  [phone]

## 2022-10-02 NOTE — PROGRESS NOTES
"  Subjective:      51 y.o. male presents to urgent care for cold symptoms that started on Friday.  He is experiencing sore throat, congestion, fatigue, and cough.  No fever, body aches, or headaches.  He has been using Tylenol with moderate relief in symptoms.  He denies any tobacco product use.  No history of asthma or COPD.  He is fully vaccinated against COVID.  No known sick contacts.    He denies any other questions or concerns at this time.    Current problem list, medication, and past medical/surgical history were reviewed in Epic.    ROS  See HPI     Objective:      /88 (BP Location: Right arm, Patient Position: Sitting, BP Cuff Size: Adult)   Pulse 85   Temp 37.1 °C (98.8 °F) (Temporal)   Resp 20   Ht 1.651 m (5' 5\")   Wt 89.4 kg (197 lb)   SpO2 96%   BMI 32.78 kg/m²     Physical Exam  Constitutional:       General: He is not in acute distress.     Appearance: He is not diaphoretic.   HENT:      Right Ear: Tympanic membrane, ear canal and external ear normal.      Left Ear: Tympanic membrane, ear canal and external ear normal.      Mouth/Throat:      Tongue: Tongue does not deviate from midline.      Palate: No lesions.      Pharynx: Uvula midline. Posterior oropharyngeal erythema present.      Tonsils: No tonsillar exudate. 1+ on the right. 1+ on the left.   Cardiovascular:      Rate and Rhythm: Normal rate and regular rhythm.      Heart sounds: Normal heart sounds.   Pulmonary:      Effort: Pulmonary effort is normal. No respiratory distress.      Breath sounds: Normal breath sounds.   Neurological:      Mental Status: He is alert.   Psychiatric:         Mood and Affect: Affect normal.         Judgment: Judgment normal.     Assessment/Plan:     1. Encounter for patient concern about exposure to infectious organism  2. Sore throat  Rapid strep negative. Testing performed for COVID-19. In the meantime patient was advised to isolate until COVID test results returned. I encouraged mask use, " frequent handwashing, wiping down hard surfaces, etc. Tylenol and Ibuprofen were recommended for symptomatic relief. If positive they will be contacted by their local health department regarding return to work/school protocols. Patient is currently without indication of need for higher level of care. Patient/Guardian was given precautionary signs/symptoms that mandate immediate follow up and evaluation in the ED. The patient and/or guardian demonstrated a good understanding and agreed with the treatment plan.  - POCT Rapid Strep A  - SARS-CoV-2 PCR (24 hour In-House): Collect NP swab in VTM; Future      Instructed to return to Urgent Care or nearest Emergency Department if symptoms fail to improve, for any change in condition, further concerns, or new concerning symptoms. Patient states understanding of the plan of care and discharge instructions.    Vesta Paredes M.D.

## 2022-10-03 LAB
SARS-COV-2 RNA RESP QL NAA+PROBE: NOTDETECTED
SPECIMEN SOURCE: NORMAL

## 2022-10-27 ENCOUNTER — OFFICE VISIT (OUTPATIENT)
Dept: MEDICAL GROUP | Facility: PHYSICIAN GROUP | Age: 52
End: 2022-10-27
Payer: COMMERCIAL

## 2022-10-27 VITALS
WEIGHT: 199 LBS | OXYGEN SATURATION: 93 % | HEIGHT: 65 IN | RESPIRATION RATE: 14 BRPM | TEMPERATURE: 97.1 F | HEART RATE: 84 BPM | BODY MASS INDEX: 33.15 KG/M2 | DIASTOLIC BLOOD PRESSURE: 82 MMHG | SYSTOLIC BLOOD PRESSURE: 140 MMHG

## 2022-10-27 DIAGNOSIS — R73.03 PREDIABETES: ICD-10-CM

## 2022-10-27 DIAGNOSIS — E78.2 MIXED HYPERLIPIDEMIA: ICD-10-CM

## 2022-10-27 DIAGNOSIS — E66.09 CLASS 1 OBESITY DUE TO EXCESS CALORIES WITHOUT SERIOUS COMORBIDITY IN ADULT, UNSPECIFIED BMI: ICD-10-CM

## 2022-10-27 DIAGNOSIS — E55.9 VITAMIN D DEFICIENCY: ICD-10-CM

## 2022-10-27 PROCEDURE — 99214 OFFICE O/P EST MOD 30 MIN: CPT | Performed by: INTERNAL MEDICINE

## 2022-10-27 NOTE — PROGRESS NOTES
"Established Patient    Chief Complaint   Patient presents with    Follow-Up    Lab Results       Subjective:     HPI:   Curly presents today with the following.    Patient Active Problem List    Diagnosis Date Noted    Vitamin D deficiency 08/10/2022    Elevated blood pressure reading 08/10/2022    Prediabetes 09/21/2020    Mixed hyperlipidemia 09/21/2020    Class 1 obesity due to excess calories without serious comorbidity in adult 03/20/2020    Skin tags, multiple acquired 03/20/2020    Chronic right shoulder pain 03/20/2020       No current outpatient medications on file prior to visit.     No current facility-administered medications on file prior to visit.       Allergies, past medical history, past surgical history, family history, social history reviewed and updated    ROS:  All other systems reviewed and are negative except as stated in the HPI       Physical Exam:     BP (!) 140/82 (BP Location: Left arm, Patient Position: Sitting, BP Cuff Size: Adult)   Pulse 84   Temp 36.2 °C (97.1 °F) (Temporal)   Resp 14   Ht 1.651 m (5' 5\")   Wt 90.3 kg (199 lb)   SpO2 93%   BMI 33.12 kg/m²   General: Normal appearing. No distress.  Pulmonary: Clear to ausculation.  Normal effort.   Cardiovascular: Regular rate and rhythm    Assessment and Plan:     51 y.o. male with the following issues.    1. Class 1 obesity due to excess calories without serious comorbidity in adult, unspecified BMI    2. Prediabetes    3. Mixed hyperlipidemia    4. Vitamin D deficiency  - Cholecalciferol 54166 UNIT Cap; Take 1 Capsule by mouth every 7 days.  Dispense: 16 Capsule; Refill: 0    Patient had lab with vitamin D deficiency, abnormal cholesterol including high triglyceride, low HDL, elevated LDL, as well as prediabetes range of A1c, also has some weight issues as well, patient denies smoking, denied having cardiovascular diseases    -Lengthy discussion regarding healthy dietary options including plenty of vegetable, reduce refine " carb's and sugar, , regular exercise as tolerated, healthy fat/protein/carbs  - Shown pictures regarding healthy lifestyle changes and healthy dietary options  -Patient would like to start these lifestyle changes to improve obesity/overweight as well as other chronic conditions    Follow Up:      Return in about 4 weeks (around 11/24/2022).    Please note that this dictation was created using voice recognition software. I have made every reasonable attempt to correct obvious errors, but I expect that there are errors of grammar and possibly content that I did not discover before finalizing the note.    Signed by: Vickey Hurd M.D.

## 2022-10-27 NOTE — PATIENT INSTRUCTIONS
"Cholesterol problem treatment:  # Bergamot 500 - 1000 mg daily  #Berberine: Take 500 mg twice a day  # Resveratrol ( berries, dark chocolate, unsweetened cocoa, 90% or  above)   Garlic, onion, leeks   Probiotics:  billions  Fermented foods= sauerkraut, yogurt \" plain, organic\", kefir, apple cider vinegar with mother, kimchi >> organic   Nuts and Seeds \" also myron and Flax seeds\" >> unsalted, un-roasted, raw, organic  Fish oil (cod liver oil), flaxseed oil, olive oil ( extra virgin, cold pressed)  Turmeric, jose, black pepper, Cinnamon combination supplement   Green Tea Matcha Organic >>2-3 cups in day    "

## 2022-11-01 ENCOUNTER — RESEARCH ENCOUNTER (OUTPATIENT)
Dept: MEDICAL GROUP | Facility: PHYSICIAN GROUP | Age: 52
End: 2022-11-01
Payer: COMMERCIAL

## 2022-11-01 DIAGNOSIS — Z00.6 RESEARCH STUDY PATIENT: ICD-10-CM

## 2022-12-21 ENCOUNTER — OFFICE VISIT (OUTPATIENT)
Dept: MEDICAL GROUP | Facility: PHYSICIAN GROUP | Age: 52
End: 2022-12-21
Payer: COMMERCIAL

## 2022-12-21 VITALS
TEMPERATURE: 97.7 F | BODY MASS INDEX: 33.99 KG/M2 | WEIGHT: 204 LBS | OXYGEN SATURATION: 95 % | HEIGHT: 65 IN | HEART RATE: 89 BPM | DIASTOLIC BLOOD PRESSURE: 80 MMHG | RESPIRATION RATE: 14 BRPM | SYSTOLIC BLOOD PRESSURE: 140 MMHG

## 2022-12-21 DIAGNOSIS — E88.810 METABOLIC SYNDROME: ICD-10-CM

## 2022-12-21 DIAGNOSIS — E66.09 CLASS 1 OBESITY DUE TO EXCESS CALORIES WITHOUT SERIOUS COMORBIDITY IN ADULT, UNSPECIFIED BMI: ICD-10-CM

## 2022-12-21 DIAGNOSIS — L91.8 SKIN TAGS, MULTIPLE ACQUIRED: ICD-10-CM

## 2022-12-21 DIAGNOSIS — E78.2 MIXED HYPERLIPIDEMIA: ICD-10-CM

## 2022-12-21 DIAGNOSIS — R73.03 PREDIABETES: ICD-10-CM

## 2022-12-21 DIAGNOSIS — R03.0 ELEVATED BLOOD PRESSURE READING: ICD-10-CM

## 2022-12-21 PROCEDURE — 99214 OFFICE O/P EST MOD 30 MIN: CPT | Performed by: INTERNAL MEDICINE

## 2022-12-21 RX ORDER — TIRZEPATIDE 2.5 MG/.5ML
1 INJECTION, SOLUTION SUBCUTANEOUS
Qty: 2 ML | Refills: 1 | Status: SHIPPED | OUTPATIENT
Start: 2022-12-21 | End: 2023-07-27

## 2022-12-21 NOTE — PROGRESS NOTES
"Established Patient    Chief Complaint   Patient presents with    Follow-Up       Subjective:     HPI:   Curly presents today with the following.    Patient Active Problem List    Diagnosis Date Noted    Metabolic syndrome 12/21/2022    Vitamin D deficiency 08/10/2022    Elevated blood pressure reading 08/10/2022    Prediabetes 09/21/2020    Mixed hyperlipidemia 09/21/2020    Class 1 obesity due to excess calories without serious comorbidity in adult 03/20/2020    Skin tags, multiple acquired 03/20/2020    Chronic right shoulder pain 03/20/2020       Current Outpatient Medications on File Prior to Visit   Medication Sig Dispense Refill    Cholecalciferol 18777 UNIT Cap Take 1 Capsule by mouth every 7 days. 16 Capsule 0     No current facility-administered medications on file prior to visit.       Allergies, past medical history, past surgical history, family history, social history reviewed and updated    ROS:  All other systems reviewed and are negative except as stated in the HPI       Physical Exam:     BP (!) 140/80 (BP Location: Left arm, Patient Position: Sitting, BP Cuff Size: Adult)   Pulse 89   Temp 36.5 °C (97.7 °F) (Temporal)   Resp 14   Ht 1.651 m (5' 5\")   Wt 92.5 kg (204 lb)   SpO2 95%   BMI 33.95 kg/m²   General: Normal appearing. No distress.  Pulmonary: Clear to ausculation.  Normal effort.   Cardiovascular: Regular rate and rhythm    Assessment and Plan:     51 y.o. male with the following issues.    1. Class 1 obesity due to excess calories without serious comorbidity in adult, unspecified BMI  - Tirzepatide (MOUNJARO) 2.5 MG/0.5ML Solution Pen-injector; Inject 1 Each under the skin every 7 days.  Dispense: 2 mL; Refill: 1    Patient started gaining some weight, he has access to some sugar and carbs as well, educated in detail regarding her lifestyle and healthy diet and regular exercise and weight loss as well    2. Elevated blood pressure reading  - Tirzepatide (MOUNJARO) 2.5 MG/0.5ML " Solution Pen-injector; Inject 1 Each under the skin every 7 days.  Dispense: 2 mL; Refill: 1    > Reported checking blood pressure at home, systolic range from 120s to 140s, diastolic with normal range, denied having symptoms,    -discussion in details on target blood pressure goal, advised monitoring BP closely at home.  Have BP log to present at follow-up visit or send through my chart.   -Advised low-salt diet, healthy dietary option include plenty of vegetable, reduce refine carbohydrates and sugar, regular exercise as tolerated, healthy fat/protein/carbs, also avoid alcohol, no NSAIDs  If symptoms worsen or persist patient can return to clinic for reevaluation.  Red flags and strict emergency room precautions discussed.  Discussed side effects of medication. Patient understand    3. Mixed hyperlipidemia  - Tirzepatide (MOUNJARO) 2.5 MG/0.5ML Solution Pen-injector; Inject 1 Each under the skin every 7 days.  Dispense: 2 mL; Refill: 1    4. Prediabetes  - Tirzepatide (MOUNJARO) 2.5 MG/0.5ML Solution Pen-injector; Inject 1 Each under the skin every 7 days.  Dispense: 2 mL; Refill: 1    5. Skin tags, multiple acquired  - Tirzepatide (MOUNJARO) 2.5 MG/0.5ML Solution Pen-injector; Inject 1 Each under the skin every 7 days.  Dispense: 2 mL; Refill: 1    6. Metabolic syndrome  - Tirzepatide (MOUNJARO) 2.5 MG/0.5ML Solution Pen-injector; Inject 1 Each under the skin every 7 days.  Dispense: 2 mL; Refill: 1    Follow Up:      Return in about 6 weeks (around 2/1/2023).    Please note that this dictation was created using voice recognition software. I have made every reasonable attempt to correct obvious errors, but I expect that there are errors of grammar and possibly content that I did not discover before finalizing the note.    Signed by: Vickey Hurd M.D.

## 2022-12-28 LAB
APOB+LDLR+PCSK9 GENE MUT ANL BLD/T: NOT DETECTED
BRCA1+BRCA2 DEL+DUP + FULL MUT ANL BLD/T: NOT DETECTED
MLH1+MSH2+MSH6+PMS2 GN DEL+DUP+FUL M: NOT DETECTED

## 2023-02-17 ENCOUNTER — APPOINTMENT (OUTPATIENT)
Dept: RADIOLOGY | Facility: IMAGING CENTER | Age: 53
End: 2023-02-17
Attending: INTERNAL MEDICINE
Payer: COMMERCIAL

## 2023-02-17 ENCOUNTER — OFFICE VISIT (OUTPATIENT)
Dept: MEDICAL GROUP | Facility: PHYSICIAN GROUP | Age: 53
End: 2023-02-17
Payer: COMMERCIAL

## 2023-02-17 VITALS
DIASTOLIC BLOOD PRESSURE: 84 MMHG | HEART RATE: 76 BPM | HEIGHT: 65 IN | BODY MASS INDEX: 34.29 KG/M2 | SYSTOLIC BLOOD PRESSURE: 140 MMHG | OXYGEN SATURATION: 98 % | WEIGHT: 205.8 LBS | TEMPERATURE: 97.6 F

## 2023-02-17 DIAGNOSIS — R05.3 CHRONIC COUGH: ICD-10-CM

## 2023-02-17 DIAGNOSIS — L03.116 CELLULITIS OF LEFT LOWER EXTREMITY: ICD-10-CM

## 2023-02-17 DIAGNOSIS — E55.9 VITAMIN D DEFICIENCY: ICD-10-CM

## 2023-02-17 PROCEDURE — 99214 OFFICE O/P EST MOD 30 MIN: CPT | Performed by: INTERNAL MEDICINE

## 2023-02-17 PROCEDURE — 71046 X-RAY EXAM CHEST 2 VIEWS: CPT | Mod: TC | Performed by: RADIOLOGY

## 2023-02-17 RX ORDER — BENZONATATE 100 MG/1
100 CAPSULE ORAL 3 TIMES DAILY PRN
Qty: 60 CAPSULE | Refills: 0 | Status: SHIPPED | OUTPATIENT
Start: 2023-02-17 | End: 2023-03-02

## 2023-02-17 RX ORDER — SULFAMETHOXAZOLE AND TRIMETHOPRIM 800; 160 MG/1; MG/1
1 TABLET ORAL 2 TIMES DAILY
Qty: 14 TABLET | Refills: 0 | Status: SHIPPED | OUTPATIENT
Start: 2023-02-17 | End: 2023-02-24

## 2023-02-17 ASSESSMENT — PATIENT HEALTH QUESTIONNAIRE - PHQ9: CLINICAL INTERPRETATION OF PHQ2 SCORE: 0

## 2023-02-17 NOTE — PROGRESS NOTES
"Established Patient    Chief Complaint   Patient presents with    Cough       Subjective:     HPI:   Curly presents today with the following.    Patient Active Problem List    Diagnosis Date Noted    Chronic cough 02/17/2023    Metabolic syndrome 12/21/2022    Vitamin D deficiency 08/10/2022    Elevated blood pressure reading 08/10/2022    Prediabetes 09/21/2020    Mixed hyperlipidemia 09/21/2020    Class 1 obesity due to excess calories without serious comorbidity in adult 03/20/2020    Skin tags, multiple acquired 03/20/2020    Chronic right shoulder pain 03/20/2020       Current Outpatient Medications on File Prior to Visit   Medication Sig Dispense Refill    Tirzepatide (MOUNJARO) 2.5 MG/0.5ML Solution Pen-injector Inject 1 Each under the skin every 7 days. (Patient not taking: Reported on 2/17/2023) 2 mL 1     No current facility-administered medications on file prior to visit.       Allergies, past medical history, past surgical history, family history, social history reviewed and updated    ROS:  All other systems reviewed and are negative except as stated in the HPI       Physical Exam:     BP (!) 140/84 (BP Location: Right arm, Patient Position: Sitting, BP Cuff Size: Adult)   Pulse 76   Temp 36.4 °C (97.6 °F) (Temporal)   Ht 1.651 m (5' 5\")   Wt 93.4 kg (205 lb 12.8 oz)   SpO2 98%   BMI 34.25 kg/m²   General: Normal appearing. No distress.  Pulmonary: Clear to ausculation.  Normal effort.   Cardiovascular: Regular rate and rhythm    Assessment and Plan:     52 y.o. male with the following issues.    1. Vitamin D deficiency  - Cholecalciferol 34855 UNIT Cap; Take 1 Capsule by mouth every 7 days.  Dispense: 4 Capsule; Refill: 3    2. Chronic cough  This is going on for 4 months, he visited [place] and he came back to [place], since then he has this annoying cough, day and night, sometimes diffusely, also has some nasal discharge, as well as postnasal discharge with itching throat, denies shortness of breath " or wheezing.  Denies any sick contact, denies any recent COVID or influenza, denies any acid reflux that contribute to discomfort, this might be some allergy component, he tried Flonase over-the-counter for nasal discharge and it was helpful  - benzonatate (TESSALON) 100 MG Cap; Take 1 Capsule by mouth 3 times a day as needed for Cough.  Dispense: 60 Capsule; Refill: 0  - DX-CHEST-2 VIEWS; Future    > If this cough continue, I will assess for asthma rule out as well    3. Cellulitis of left lower extremity  This is going on for a week or so, denies any trauma or injury, left lower leg, posterior aspect, few centimeters, circular, tender, signs for cellulitis, few spots of pus which could not be drained today, advised to take antibiotic and follow-up with me after finishing antibiotic  - sulfamethoxazole-trimethoprim (BACTRIM DS) 800-160 MG tablet; Take 1 Tablet by mouth 2 times a day for 7 days.  Dispense: 14 Tablet; Refill: 0    Follow Up:      Return in about 10 days (around 2/27/2023).    Please note that this dictation was created using voice recognition software. I have made every reasonable attempt to correct obvious errors, but I expect that there are errors of grammar and possibly content that I did not discover before finalizing the note.    Signed by: Vickey Hurd M.D.

## 2023-03-02 ENCOUNTER — HOSPITAL ENCOUNTER (OUTPATIENT)
Facility: MEDICAL CENTER | Age: 53
End: 2023-03-02
Attending: INTERNAL MEDICINE
Payer: COMMERCIAL

## 2023-03-02 ENCOUNTER — OFFICE VISIT (OUTPATIENT)
Dept: MEDICAL GROUP | Facility: PHYSICIAN GROUP | Age: 53
End: 2023-03-02
Payer: COMMERCIAL

## 2023-03-02 ENCOUNTER — TELEPHONE (OUTPATIENT)
Dept: MEDICAL GROUP | Facility: PHYSICIAN GROUP | Age: 53
End: 2023-03-02

## 2023-03-02 VITALS
HEART RATE: 80 BPM | SYSTOLIC BLOOD PRESSURE: 122 MMHG | WEIGHT: 200 LBS | TEMPERATURE: 97.1 F | HEIGHT: 65 IN | OXYGEN SATURATION: 97 % | DIASTOLIC BLOOD PRESSURE: 80 MMHG | BODY MASS INDEX: 33.32 KG/M2 | RESPIRATION RATE: 14 BRPM

## 2023-03-02 DIAGNOSIS — R05.3 CHRONIC COUGH: ICD-10-CM

## 2023-03-02 DIAGNOSIS — L02.416 CELLULITIS AND ABSCESS OF LEFT LEG: ICD-10-CM

## 2023-03-02 DIAGNOSIS — L03.116 CELLULITIS AND ABSCESS OF LEFT LEG: ICD-10-CM

## 2023-03-02 PROCEDURE — 99214 OFFICE O/P EST MOD 30 MIN: CPT | Mod: 25 | Performed by: INTERNAL MEDICINE

## 2023-03-02 PROCEDURE — 10060 I&D ABSCESS SIMPLE/SINGLE: CPT | Performed by: INTERNAL MEDICINE

## 2023-03-02 RX ORDER — CLINDAMYCIN HYDROCHLORIDE 300 MG/1
300 CAPSULE ORAL 3 TIMES DAILY
Qty: 21 CAPSULE | Refills: 0 | Status: SHIPPED | OUTPATIENT
Start: 2023-03-02 | End: 2023-03-09

## 2023-03-02 NOTE — PROGRESS NOTES
"Established Patient    Chief Complaint   Patient presents with    Follow-Up       Subjective:     HPI:   Curly presents today with the following.    Patient Active Problem List    Diagnosis Date Noted    Chronic cough 02/17/2023    Metabolic syndrome 12/21/2022    Vitamin D deficiency 08/10/2022    Elevated blood pressure reading 08/10/2022    Prediabetes 09/21/2020    Mixed hyperlipidemia 09/21/2020    Class 1 obesity due to excess calories without serious comorbidity in adult 03/20/2020    Skin tags, multiple acquired 03/20/2020    Chronic right shoulder pain 03/20/2020       Current Outpatient Medications on File Prior to Visit   Medication Sig Dispense Refill    Cholecalciferol 65601 UNIT Cap Take 1 Capsule by mouth every 7 days. 4 Capsule 3    Tirzepatide (MOUNJARO) 2.5 MG/0.5ML Solution Pen-injector Inject 1 Each under the skin every 7 days. (Patient not taking: Reported on 2/17/2023) 2 mL 1     No current facility-administered medications on file prior to visit.       Allergies, past medical history, past surgical history, family history, social history reviewed and updated    ROS:  All other systems reviewed and are negative except as stated in the HPI       Physical Exam:     /80 (BP Location: Left arm, Patient Position: Sitting, BP Cuff Size: Adult)   Pulse 80   Temp 36.2 °C (97.1 °F) (Temporal)   Resp 14   Ht 1.651 m (5' 5\")   Wt 90.7 kg (200 lb)   SpO2 97%   BMI 33.28 kg/m²   General: Normal appearing. No distress.  Pulmonary: Clear to ausculation.  Normal effort.   Cardiovascular: Regular rate and rhythm    Assessment and Plan:     52 y.o. male with the following issues.    1. Cellulitis and abscess of left leg  Patient has no response to Bactrim for 7 days,  and he has signs of abscess and mainly cellulitis, denies any foreign body insertion  -Today we will perform I&D to drain the abscess, patient denied having systemic symptoms  - clindamycin (CLEOCIN) 300 MG Cap; Take 1 " Capsule by mouth 3 times a day for 7 days.  Dispense: 21 Capsule; Refill: 0  - CULTURE WOUND W/ GRAM STAIN; Future    PROCEDURE NOTE:  I & D of left leg, posterior aspect    INDICATION: Abscess formation, pain    CONSENT:  Procedure in its entirety, indications, complications, and potential risk including bleeding, infection, scar, repeat procedure needed, were explained to the patient by myself. All questions were answered prior to onset of procedure. Verbal consent was obtained.    DESCRIPTION:  Curly Bedolla was brought back to procedure room and was placed in prone position on table after verbal and written consent was obtained. Confirmed left leg posterior aspect was area requiring I & D (obvious also by exam).  Confirmed allergies.  Abscess was cleansed with alcohol swab, approximately 4 ml of 1% Lidocaine with epinephrine was used for local anesthesia. Area was prepped in sterile fashion with Betadine and sterile draped. In sterile fashion, after confirming good local anesthesia, used 11 blade to make small 1 cm incision, approximately 2 ml of purulent material was expressed followed by bloody drainage. Culture was collected.   Using hemostat, gently tried to break up any potential loculations. Cavity was packed with iodoform gauze and 2 cm tail was left exposed. Bandaid was placed.     Patient tolerated the procedure well without complications. The patient was given instructions for post I & D care, signs and symptoms that indicated the need to be seen in clinic and/or ER. The patient will return sooner with new, change, or worsening of symptoms.    SPECIMENS OBTAINED: Wound culture.    2. Chronic cough  Patient has chest x-ray which was unremarkable, reported improving,    Follow Up:      Return in about 3 days (around 3/5/2023).  For wound check    Please note that this dictation was created using voice recognition software. I have made every reasonable attempt to correct obvious errors, but I  expect that there are errors of grammar and possibly content that I did not discover before finalizing the note.    Signed by: Vickey Hurd M.D.

## 2023-03-02 NOTE — TELEPHONE ENCOUNTER
VOICEMAIL  1. Caller Name: [facility]                      Call Back Number: [phone] opt [phone]    2. Message: They received the wrong swab for the wound culture. The culture can only be collected on a red top swab or an E swab.    3. Patient approves office to leave a detailed voicemail/MyChart message: yes

## 2023-03-22 ENCOUNTER — HOSPITAL ENCOUNTER (OUTPATIENT)
Dept: LAB | Facility: MEDICAL CENTER | Age: 53
End: 2023-03-22
Attending: PATHOLOGY
Payer: COMMERCIAL

## 2023-06-06 ENCOUNTER — DOCUMENTATION (OUTPATIENT)
Dept: HEALTH INFORMATION MANAGEMENT | Facility: OTHER | Age: 53
End: 2023-06-06
Payer: COMMERCIAL

## 2023-06-06 ENCOUNTER — PATIENT MESSAGE (OUTPATIENT)
Dept: HEALTH INFORMATION MANAGEMENT | Facility: OTHER | Age: 53
End: 2023-06-06

## 2023-07-27 ENCOUNTER — OFFICE VISIT (OUTPATIENT)
Dept: MEDICAL GROUP | Facility: PHYSICIAN GROUP | Age: 53
End: 2023-07-27
Payer: COMMERCIAL

## 2023-07-27 ENCOUNTER — APPOINTMENT (OUTPATIENT)
Dept: RADIOLOGY | Facility: IMAGING CENTER | Age: 53
End: 2023-07-27
Attending: FAMILY MEDICINE
Payer: COMMERCIAL

## 2023-07-27 VITALS
RESPIRATION RATE: 14 BRPM | WEIGHT: 207 LBS | HEIGHT: 65 IN | DIASTOLIC BLOOD PRESSURE: 84 MMHG | OXYGEN SATURATION: 97 % | SYSTOLIC BLOOD PRESSURE: 124 MMHG | BODY MASS INDEX: 34.49 KG/M2 | HEART RATE: 87 BPM | TEMPERATURE: 98.1 F

## 2023-07-27 DIAGNOSIS — E55.9 VITAMIN D DEFICIENCY: ICD-10-CM

## 2023-07-27 DIAGNOSIS — R73.03 PREDIABETES: ICD-10-CM

## 2023-07-27 DIAGNOSIS — L73.8 SEBACEOUS HYPERPLASIA OF FACE: ICD-10-CM

## 2023-07-27 DIAGNOSIS — E78.2 MIXED HYPERLIPIDEMIA: ICD-10-CM

## 2023-07-27 DIAGNOSIS — M25.512 CHRONIC LEFT SHOULDER PAIN: ICD-10-CM

## 2023-07-27 DIAGNOSIS — R06.83 LOUD SNORING: ICD-10-CM

## 2023-07-27 DIAGNOSIS — G89.29 CHRONIC LEFT SHOULDER PAIN: ICD-10-CM

## 2023-07-27 DIAGNOSIS — E66.09 CLASS 1 OBESITY DUE TO EXCESS CALORIES WITHOUT SERIOUS COMORBIDITY WITH BODY MASS INDEX (BMI) OF 34.0 TO 34.9 IN ADULT: ICD-10-CM

## 2023-07-27 PROCEDURE — 73030 X-RAY EXAM OF SHOULDER: CPT | Mod: TC,LT | Performed by: RADIOLOGY

## 2023-07-27 PROCEDURE — 99214 OFFICE O/P EST MOD 30 MIN: CPT | Performed by: FAMILY MEDICINE

## 2023-07-27 PROCEDURE — 3079F DIAST BP 80-89 MM HG: CPT | Performed by: FAMILY MEDICINE

## 2023-07-27 PROCEDURE — 3074F SYST BP LT 130 MM HG: CPT | Performed by: FAMILY MEDICINE

## 2023-07-27 NOTE — PROGRESS NOTES
"CHIEF COMPLAINT / REASON FOR VISIT  Curly Bedolla is a 52 y.o. male that presents today to [facility] care.    HISTORY OF PRESENT ILLNESS  Chronic left shoulder pain  5 years ago injured left shoulder during crossfit. Has to sleep on right side because pressure on the left shoulder would cause pain. Sometimes aggravated by reaching motions, pushups    Loud snoring, wakes up with dry mouth    Past Medical History  Vit d - underwent several months of treatment    No past surgical history on file.    Social History     Tobacco Use    Smoking status: Never    Smokeless tobacco: Never   Vaping Use    Vaping Use: Never used   Substance Use Topics    Alcohol use: Not Currently    Drug use: Never     OBJECTIVE    /84 (BP Location: Right arm, Patient Position: Sitting, BP Cuff Size: Adult)   Pulse 87   Temp 36.7 °C (98.1 °F) (Temporal)   Resp 14   Ht 1.651 m (5' 5\")   Wt 93.9 kg (207 lb)   SpO2 97%   BMI 34.45 kg/m²      PHYSICAL EXAM  Constitutional: Sitting comfortably, in no acute distress, responds to questions appropriately.  Head: Normocephalic  Eyes:  No conjunctival injection, no scleral icterus, PERRL  Mouth: Oral mucosa moist  Throat: Oropharynx clear without erythema or tonsillar exudates  Neck: No cervical lymphadenopathy  Heart: Regular S1 S2, no murmurs, rub, or gallops  Lungs: Clear to auscultation bilaterally, no wheezes, rales, or rhonchi  Extremities: 2+ symmetric radial pulses  Skin: Several oily skin colored 1 to 2 mm papules scattered across face, particularly forehead, temples, nose  Left Shoulder:   -- Inspection: No erythema, scars, obvious deformities, or atrophy.  -- No tenderness to palpation  -- Active range of motion: 170 degrees shoulder flexion bilaterally, 180 abduction bilaterally, 70 external rotation bilaterally, high lumbar internal on the left (compared to low thoracic internal on right)  -- Normal sensation to light touch in upper extremities  -- 5/5 muscle " strength in shoulder external rotation, internal rotation, abduction. 5/5 in biceps flexion and extension  -- Special tests: Negative Howard, negative Neer, negative empty Can, negative drop Arm, negative Speed's, negative Yergason's, pain with liftoff without weakness    ASSESSMENT & PLAN  1. Chronic left shoulder pain  Suspect rotator cuff tendinopathy from injury 5 years ago.  Still has good strength and so I do not think he has any complete tendon tears.  Will obtain radiographs and follow-up at next visit for possible subacromial shoulder injection.  Recommended physical therapy  - DX-SHOULDER 2+ LEFT; Future  - Referral to Physical Therapy    2. Sebaceous hyperplasia of face  Several sebaceous hyperplasia lesions on face.  He is wondering about possible treatment options for these so we will refer to dermatology  - Referral to Dermatology    3. Mixed hyperlipidemia  Untreated, due for repeat labs  - Lipid Profile; Future    4. Prediabetes  Due for repeat labs  - HEMOGLOBIN A1C; Future  - Comp Metabolic Panel; Future    5. Vitamin D deficiency  Completed 4 months of treatment with cholecalciferol 50,000 units weekly.  Recheck levels  - VITAMIN D,25 HYDROXY (DEFICIENCY); Future    6. Class 1 obesity due to excess calories without serious comorbidity with body mass index (BMI) of 34.0 to 34.9 in adult  - Patient identified as having weight management issue.  Appropriate orders and counseling given.  - Comp Metabolic Panel; Future  - CBC WITH DIFFERENTIAL; Future  - TSH WITH REFLEX TO FT4; Future    7. Loud snoring  Did not have time to fully discuss today, but at next visit would consider sleep medicine referral.  Need to calculate STOP-BANG score    Follow-up after labs and x-ray

## 2023-08-03 ENCOUNTER — APPOINTMENT (RX ONLY)
Dept: URBAN - METROPOLITAN AREA CLINIC 4 | Facility: CLINIC | Age: 53
Setting detail: DERMATOLOGY
End: 2023-08-03

## 2023-08-03 DIAGNOSIS — L20.89 OTHER ATOPIC DERMATITIS: ICD-10-CM

## 2023-08-03 DIAGNOSIS — L73.8 OTHER SPECIFIED FOLLICULAR DISORDERS: ICD-10-CM

## 2023-08-03 DIAGNOSIS — L81.4 OTHER MELANIN HYPERPIGMENTATION: ICD-10-CM

## 2023-08-03 DIAGNOSIS — D18.0 HEMANGIOMA: ICD-10-CM

## 2023-08-03 DIAGNOSIS — L91.8 OTHER HYPERTROPHIC DISORDERS OF THE SKIN: ICD-10-CM

## 2023-08-03 DIAGNOSIS — L82.1 OTHER SEBORRHEIC KERATOSIS: ICD-10-CM

## 2023-08-03 PROBLEM — D18.01 HEMANGIOMA OF SKIN AND SUBCUTANEOUS TISSUE: Status: ACTIVE | Noted: 2023-08-03

## 2023-08-03 PROCEDURE — 99203 OFFICE O/P NEW LOW 30 MIN: CPT

## 2023-08-03 PROCEDURE — ? MEDICATION COUNSELING

## 2023-08-03 PROCEDURE — ? BENIGN DESTRUCTION COSMETIC

## 2023-08-03 PROCEDURE — ? PRESCRIPTION

## 2023-08-03 PROCEDURE — ? COUNSELING

## 2023-08-03 RX ORDER — TRIAMCINOLONE ACETONIDE 1 MG/G
1 CREAM TOPICAL BID
Qty: 15 | Refills: 0 | Status: ERX | COMMUNITY
Start: 2023-08-03

## 2023-08-03 RX ORDER — MUPIROCIN 20 MG/G
OINTMENT TOPICAL BID
Qty: 22 | Refills: 0 | Status: ERX | COMMUNITY
Start: 2023-08-03

## 2023-08-03 RX ADMIN — MUPIROCIN: 20 OINTMENT TOPICAL at 00:00

## 2023-08-03 RX ADMIN — TRIAMCINOLONE ACETONIDE 1: 1 CREAM TOPICAL at 00:00

## 2023-08-03 ASSESSMENT — LOCATION SIMPLE DESCRIPTION DERM
LOCATION SIMPLE: LEFT CALF
LOCATION SIMPLE: LEFT UPPER ARM
LOCATION SIMPLE: RIGHT ANTERIOR NECK
LOCATION SIMPLE: RIGHT UPPER ARM
LOCATION SIMPLE: RIGHT FOREHEAD
LOCATION SIMPLE: LEFT ANTERIOR NECK
LOCATION SIMPLE: INFERIOR FOREHEAD
LOCATION SIMPLE: LEFT FOREHEAD
LOCATION SIMPLE: CHEST

## 2023-08-03 ASSESSMENT — LOCATION DETAILED DESCRIPTION DERM
LOCATION DETAILED: RIGHT CLAVICULAR NECK
LOCATION DETAILED: RIGHT INFERIOR LATERAL FOREHEAD
LOCATION DETAILED: LEFT INFERIOR LATERAL FOREHEAD
LOCATION DETAILED: RIGHT DISTAL POSTERIOR UPPER ARM
LOCATION DETAILED: INFERIOR MID FOREHEAD
LOCATION DETAILED: RIGHT PROXIMAL POSTERIOR UPPER ARM
LOCATION DETAILED: LEFT PROXIMAL POSTERIOR UPPER ARM
LOCATION DETAILED: RIGHT LATERAL INFERIOR CHEST
LOCATION DETAILED: LEFT DISTAL POSTERIOR UPPER ARM
LOCATION DETAILED: LEFT DISTAL CALF
LOCATION DETAILED: LEFT CLAVICULAR NECK
LOCATION DETAILED: RIGHT INFERIOR LATERAL NECK

## 2023-08-03 ASSESSMENT — LOCATION ZONE DERM
LOCATION ZONE: FACE
LOCATION ZONE: TRUNK
LOCATION ZONE: LEG
LOCATION ZONE: ARM
LOCATION ZONE: NECK

## 2023-08-03 NOTE — PROCEDURE: MEDICATION COUNSELING
Itraconazole Counseling:  I discussed with the patient the risks of itraconazole including but not limited to liver damage, nausea/vomiting, neuropathy, and severe allergy.  The patient understands that this medication is best absorbed when taken with acidic beverages such as non-diet cola or ginger ale.  The patient understands that monitoring is required including baseline LFTs and repeat LFTs at intervals.  The patient understands that they are to contact us or the primary physician if concerning signs are noted.
Topical Retinoid counseling:  Patient advised to apply a pea-sized amount only at bedtime and wait 30 minutes after washing their face before applying.  If too drying, patient may add a non-comedogenic moisturizer. The patient verbalized understanding of the proper use and possible adverse effects of retinoids.  All of the patient's questions and concerns were addressed.
Adbry Pregnancy And Lactation Text: It is unknown if this medication will adversely affect pregnancy or breast feeding.
Spironolactone Counseling: Patient advised regarding risks of diarrhea, abdominal pain, hyperkalemia, birth defects (for female patients), liver toxicity and renal toxicity. The patient may need blood work to monitor liver and kidney function and potassium levels while on therapy. The patient verbalized understanding of the proper use and possible adverse effects of spironolactone.  All of the patient's questions and concerns were addressed.
Topical Metronidazole Pregnancy And Lactation Text: This medication is Pregnancy Category B and considered safe during pregnancy.  It is also considered safe to use while breastfeeding.
Humira Counseling:  I discussed with the patient the risks of adalimumab including but not limited to myelosuppression, immunosuppression, autoimmune hepatitis, demyelinating diseases, lymphoma, and serious infections.  The patient understands that monitoring is required including a PPD at baseline and must alert us or the primary physician if symptoms of infection or other concerning signs are noted.
Oxybutynin Counseling:  I discussed with the patient the risks of oxybutynin including but not limited to skin rash, drowsiness, dry mouth, difficulty urinating, and blurred vision.
Klisyri Pregnancy And Lactation Text: It is unknown if this medication can harm a developing fetus or if it is excreted in breast milk.
Erythromycin Counseling:  I discussed with the patient the risks of erythromycin including but not limited to GI upset, allergic reaction, drug rash, diarrhea, increase in liver enzymes, and yeast infections.
Bexarotene Counseling:  I discussed with the patient the risks of bexarotene including but not limited to hair loss, dry lips/skin/eyes, liver abnormalities, hyperlipidemia, pancreatitis, depression/suicidal ideation, photosensitivity, drug rash/allergic reactions, hypothyroidism, anemia, leukopenia, infection, cataracts, and teratogenicity.  Patient understands that they will need regular blood tests to check lipid profile, liver function tests, white blood cell count, thyroid function tests and pregnancy test if applicable.
Niacinamide Pregnancy And Lactation Text: These medications are considered safe during pregnancy.
Libtayo Pregnancy And Lactation Text: This medication is contraindicated in pregnancy and when breast feeding.
Elidel Counseling: Patient may experience a mild burning sensation during topical application. Elidel is not approved in children less than 2 years of age. There have been case reports of hematologic and skin malignancies in patients using topical calcineurin inhibitors although causality is questionable.
Rifampin Pregnancy And Lactation Text: This medication is Pregnancy Category C and it isn't know if it is safe during pregnancy. It is also excreted in breast milk and should not be used if you are breast feeding.
Taltz Pregnancy And Lactation Text: The risk during pregnancy and breastfeeding is uncertain with this medication.
Valtrex Counseling: I discussed with the patient the risks of valacyclovir including but not limited to kidney damage, nausea, vomiting and severe allergy.  The patient understands that if the infection seems to be worsening or is not improving, they are to call.
Rinvoq Counseling: I discussed with the patient the risks of Rinvoq therapy including but not limited to upper respiratory tract infections, shingles, cold sores, bronchitis, nausea, cough, fever, acne, and headache. Live vaccines should be avoided.  This medication has been linked to serious infections; higher rate of mortality; malignancy and lymphoproliferative disorders; major adverse cardiovascular events; thrombosis; thrombocytopenia, anemia, and neutropenia; lipid elevations; liver enzyme elevations; and gastrointestinal perforations.
Minoxidil Counseling: Minoxidil is a topical medication which can increase blood flow where it is applied. It is uncertain how this medication increases hair growth. Side effects are uncommon and include stinging and allergic reactions.
Oxybutynin Pregnancy And Lactation Text: This medication is Pregnancy Category B and is considered safe during pregnancy. It is unknown if it is excreted in breast milk.
Protopic Pregnancy And Lactation Text: This medication is Pregnancy Category C. It is unknown if this medication is excreted in breast milk when applied topically.
Arava Pregnancy And Lactation Text: This medication is Pregnancy Category X and is absolutely contraindicated during pregnancy. It is unknown if it is excreted in breast milk.
Doxepin Pregnancy And Lactation Text: This medication is Pregnancy Category C and it isn't known if it is safe during pregnancy. It is also excreted in breast milk and breast feeding isn't recommended.
Gabapentin Counseling: I discussed with the patient the risks of gabapentin including but not limited to dizziness, somnolence, fatigue and ataxia.
Cyclosporine Pregnancy And Lactation Text: This medication is Pregnancy Category C and it isn't know if it is safe during pregnancy. This medication is excreted in breast milk.
Azelaic Acid Pregnancy And Lactation Text: This medication is considered safe during pregnancy and breast feeding.
Simponi Counseling:  I discussed with the patient the risks of golimumab including but not limited to myelosuppression, immunosuppression, autoimmune hepatitis, demyelinating diseases, lymphoma, and serious infections.  The patient understands that monitoring is required including a PPD at baseline and must alert us or the primary physician if symptoms of infection or other concerning signs are noted.
Vtama Pregnancy And Lactation Text: It is unknown if this medication can cause problems during pregnancy and breastfeeding.
Spironolactone Pregnancy And Lactation Text: This medication can cause feminization of the male fetus and should be avoided during pregnancy. The active metabolite is also found in breast milk.
Nsaids Counseling: NSAID Counseling: I discussed with the patient that NSAIDs should be taken with food. Prolonged use of NSAIDs can result in the development of stomach ulcers.  Patient advised to stop taking NSAIDs if abdominal pain occurs.  The patient verbalized understanding of the proper use and possible adverse effects of NSAIDs.  All of the patient's questions and concerns were addressed.
Methotrexate Counseling:  Patient counseled regarding adverse effects of methotrexate including but not limited to nausea, vomiting, abnormalities in liver function tests. Patients may develop mouth sores, rash, diarrhea, and abnormalities in blood counts. The patient understands that monitoring is required including LFT's and blood counts.  There is a rare possibility of scarring of the liver and lung problems that can occur when taking methotrexate. Persistent nausea, loss of appetite, pale stools, dark urine, cough, and shortness of breath should be reported immediately. Patient advised to discontinue methotrexate treatment at least three months before attempting to become pregnant.  I discussed the need for folate supplements while taking methotrexate.  These supplements can decrease side effects during methotrexate treatment. The patient verbalized understanding of the proper use and possible adverse effects of methotrexate.  All of the patient's questions and concerns were addressed.
Itraconazole Pregnancy And Lactation Text: This medication is Pregnancy Category C and it isn't know if it is safe during pregnancy. It is also excreted in breast milk.
Clofazimine Counseling:  I discussed with the patient the risks of clofazimine including but not limited to skin and eye pigmentation, liver damage, nausea/vomiting, gastrointestinal bleeding and allergy.
Hydroxyzine Counseling: Patient advised that the medication is sedating and not to drive a car after taking this medication.  Patient informed of potential adverse effects including but not limited to dry mouth, urinary retention, and blurry vision.  The patient verbalized understanding of the proper use and possible adverse effects of hydroxyzine.  All of the patient's questions and concerns were addressed.
Gabapentin Pregnancy And Lactation Text: This medication is Pregnancy Category C and isn't considered safe during pregnancy. It is excreted in breast milk.
Odomzo Counseling- I discussed with the patient the risks of Odomzo including but not limited to nausea, vomiting, diarrhea, constipation, weight loss, changes in the sense of taste, decreased appetite, muscle spasms, and hair loss.  The patient verbalized understanding of the proper use and possible adverse effects of Odomzo.  All of the patient's questions and concerns were addressed.
Elidel Pregnancy And Lactation Text: This medication is Pregnancy Category C. It is unknown if this medication is excreted in breast milk.
Topical Steroids Counseling: I discussed with the patient that prolonged use of topical steroids can result in the increased appearance of superficial blood vessels (telangiectasias), lightening (hypopigmentation) and thinning of the skin (atrophy).  Patient understands to avoid using high potency steroids in skin folds, the groin or the face.  The patient verbalized understanding of the proper use and possible adverse effects of topical steroids.  All of the patient's questions and concerns were addressed.
Sarecycline Counseling: Patient advised regarding possible photosensitivity and discoloration of the teeth, skin, lips, tongue and gums.  Patient instructed to avoid sunlight, if possible.  When exposed to sunlight, patients should wear protective clothing, sunglasses, and sunscreen.  The patient was instructed to call the office immediately if the following severe adverse effects occur:  hearing changes, easy bruising/bleeding, severe headache, or vision changes.  The patient verbalized understanding of the proper use and possible adverse effects of sarecycline.  All of the patient's questions and concerns were addressed.
Bexarotene Pregnancy And Lactation Text: This medication is Pregnancy Category X and should not be given to women who are pregnant or may become pregnant. This medication should not be used if you are breast feeding.
Humira Pregnancy And Lactation Text: This medication is Pregnancy Category B and is considered safe during pregnancy. It is unknown if this medication is excreted in breast milk.
Erythromycin Pregnancy And Lactation Text: This medication is Pregnancy Category B and is considered safe during pregnancy. It is also excreted in breast milk.
Cimzia Counseling:  I discussed with the patient the risks of Cimzia including but not limited to immunosuppression, allergic reactions and infections.  The patient understands that monitoring is required including a PPD at baseline and must alert us or the primary physician if symptoms of infection or other concerning signs are noted.
Topical Steroids Applications Pregnancy And Lactation Text: Most topical steroids are considered safe to use during pregnancy and lactation.  Any topical steroid applied to the breast or nipple should be washed off before breastfeeding.
Ilumya Counseling: I discussed with the patient the risks of tildrakizumab including but not limited to immunosuppression, malignancy, posterior leukoencephalopathy syndrome, and serious infections.  The patient understands that monitoring is required including a PPD at baseline and must alert us or the primary physician if symptoms of infection or other concerning signs are noted.
Sarecycline Pregnancy And Lactation Text: This medication is Pregnancy Category D and not consider safe during pregnancy. It is also excreted in breast milk.
Isotretinoin Counseling: Patient should get monthly blood tests, not donate blood, not drive at night if vision affected, not share medication, and not undergo elective surgery for 6 months after tx completed. Side effects reviewed, pt to contact office should one occur.
Valtrex Pregnancy And Lactation Text: this medication is Pregnancy Category B and is considered safe during pregnancy. This medication is not directly found in breast milk but it's metabolite acyclovir is present.
Qbrexza Counseling:  I discussed with the patient the risks of Qbrexza including but not limited to headache, mydriasis, blurred vision, dry eyes, nasal dryness, dry mouth, dry throat, dry skin, urinary hesitation, and constipation.  Local skin reactions including erythema, burning, stinging, and itching can also occur.
Zoryve Counseling:  I discussed with the patient that Zoryve is not for use in the eyes, mouth or vagina. The most commonly reported side effects include diarrhea, headache, insomnia, application site pain, upper respiratory tract infections, and urinary tract infections.  All of the patient's questions and concerns were addressed.
Tremfya Counseling: I discussed with the patient the risks of guselkumab including but not limited to immunosuppression, serious infections, and drug reactions.  The patient understands that monitoring is required including a PPD at baseline and must alert us or the primary physician if symptoms of infection or other concerning signs are noted.
Benzoyl Peroxide Counseling: Patient counseled that medicine may cause skin irritation and bleach clothing.  In the event of skin irritation, the patient was advised to reduce the amount of the drug applied or use it less frequently.   The patient verbalized understanding of the proper use and possible adverse effects of benzoyl peroxide.  All of the patient's questions and concerns were addressed.
Azithromycin Counseling:  I discussed with the patient the risks of azithromycin including but not limited to GI upset, allergic reaction, drug rash, diarrhea, and yeast infections.
Propranolol Counseling:  I discussed with the patient the risks of propranolol including but not limited to low heart rate, low blood pressure, low blood sugar, restlessness and increased cold sensitivity. They should call the office if they experience any of these side effects.
Rinvoq Pregnancy And Lactation Text: Based on animal studies, Rinvoq may cause embryo-fetal harm when administered to pregnant women.  The medication should not be used in pregnancy.  Breastfeeding is not recommended during treatment and for 6 days after the last dose.
Minoxidil Pregnancy And Lactation Text: This medication has not been assigned a Pregnancy Risk Category but animal studies failed to show danger with the topical medication. It is unknown if the medication is excreted in breast milk.
Glycopyrrolate Counseling:  I discussed with the patient the risks of glycopyrrolate including but not limited to skin rash, drowsiness, dry mouth, difficulty urinating, and blurred vision.
Qbrexza Pregnancy And Lactation Text: There is no available data on Qbrexza use in pregnant women.  There is no available data on Qbrexza use in lactation.
Sotyktu Counseling:  I discussed the most common side effects of Sotyktu including: common cold, sore throat, sinus infections, cold sores, canker sores, folliculitis, and acne.  I also discussed more serious side effects of Sotyktu including but not limited to: serious allergic reactions; increased risk for infections such as TB; cancers such as lymphomas; rhabdomyolysis and elevated CPK; and elevated triglycerides and liver enzymes. 
Skyrizi Counseling: I discussed with the patient the risks of risankizumab-rzaa including but not limited to immunosuppression, and serious infections.  The patient understands that monitoring is required including a PPD at baseline and must alert us or the primary physician if symptoms of infection or other concerning signs are noted.
Hydroxyzine Pregnancy And Lactation Text: This medication is not safe during pregnancy and should not be taken. It is also excreted in breast milk and breast feeding isn't recommended.
Methotrexate Pregnancy And Lactation Text: This medication is Pregnancy Category X and is known to cause fetal harm. This medication is excreted in breast milk.
Use Enhanced Medication Counseling?: No
Tazorac Counseling:  Patient advised that medication is irritating and drying.  Patient may need to apply sparingly and wash off after an hour before eventually leaving it on overnight.  The patient verbalized understanding of the proper use and possible adverse effects of tazorac.  All of the patient's questions and concerns were addressed.
Metronidazole Counseling:  I discussed with the patient the risks of metronidazole including but not limited to seizures, nausea/vomiting, a metallic taste in the mouth, nausea/vomiting and severe allergy.
Eucrisa Counseling: Patient may experience a mild burning sensation during topical application. Eucrisa is not approved in children less than 3 months of age.
Cimzia Pregnancy And Lactation Text: This medication crosses the placenta but can be considered safe in certain situations. Cimzia may be excreted in breast milk.
Nsaids Pregnancy And Lactation Text: These medications are considered safe up to 30 weeks gestation. It is excreted in breast milk.
Ketoconazole Counseling:   Patient counseled regarding improving absorption with orange juice.  Adverse effects include but are not limited to breast enlargement, headache, diarrhea, nausea, upset stomach, liver function test abnormalities, taste disturbance, and stomach pain.  There is a rare possibility of liver failure that can occur when taking ketoconazole. The patient understands that monitoring of LFTs may be required, especially at baseline. The patient verbalized understanding of the proper use and possible adverse effects of ketoconazole.  All of the patient's questions and concerns were addressed.
Propranolol Pregnancy And Lactation Text: This medication is Pregnancy Category C and it isn't known if it is safe during pregnancy. It is excreted in breast milk.
Tazorac Pregnancy And Lactation Text: This medication is not safe during pregnancy. It is unknown if this medication is excreted in breast milk.
Cosentyx Counseling:  I discussed with the patient the risks of Cosentyx including but not limited to worsening of Crohn's disease, immunosuppression, allergic reactions and infections.  The patient understands that monitoring is required including a PPD at baseline and must alert us or the primary physician if symptoms of infection or other concerning signs are noted.
Olanzapine Counseling- I discussed with the patient the common side effects of olanzapine including but are not limited to: lack of energy, dry mouth, increased appetite, sleepiness, tremor, constipation, dizziness, changes in behavior, or restlessness.  Explained that teenagers are more likely to experience headaches, abdominal pain, pain in the arms or legs, tiredness, and sleepiness.  Serious side effects include but are not limited: increased risk of death in elderly patients who are confused, have memory loss, or dementia-related psychosis; hyperglycemia; increased cholesterol and triglycerides; and weight gain.
Ketoconazole Pregnancy And Lactation Text: This medication is Pregnancy Category C and it isn't know if it is safe during pregnancy. It is also excreted in breast milk and breast feeding isn't recommended.
Mirvaso Counseling: Mirvaso is a topical medication which can decrease superficial blood flow where applied. Side effects are uncommon and include stinging, redness and allergic reactions.
Azithromycin Pregnancy And Lactation Text: This medication is considered safe during pregnancy and is also secreted in breast milk.
Benzoyl Peroxide Pregnancy And Lactation Text: This medication is Pregnancy Category C. It is unknown if benzoyl peroxide is excreted in breast milk.
Azathioprine Counseling:  I discussed with the patient the risks of azathioprine including but not limited to myelosuppression, immunosuppression, hepatotoxicity, lymphoma, and infections.  The patient understands that monitoring is required including baseline LFTs, Creatinine, possible TPMP genotyping and weekly CBCs for the first month and then every 2 weeks thereafter.  The patient verbalized understanding of the proper use and possible adverse effects of azathioprine.  All of the patient's questions and concerns were addressed.
Isotretinoin Pregnancy And Lactation Text: This medication is Pregnancy Category X and is considered extremely dangerous during pregnancy. It is unknown if it is excreted in breast milk.
Tetracycline Counseling: Patient counseled regarding possible photosensitivity and increased risk for sunburn.  Patient instructed to avoid sunlight, if possible.  When exposed to sunlight, patients should wear protective clothing, sunglasses, and sunscreen.  The patient was instructed to call the office immediately if the following severe adverse effects occur:  hearing changes, easy bruising/bleeding, severe headache, or vision changes.  The patient verbalized understanding of the proper use and possible adverse effects of tetracycline.  All of the patient's questions and concerns were addressed. Patient understands to avoid pregnancy while on therapy due to potential birth defects.
Topical Sulfur Applications Counseling: Topical Sulfur Counseling: Patient counseled that this medication may cause skin irritation or allergic reactions.  In the event of skin irritation, the patient was advised to reduce the amount of the drug applied or use it less frequently.   The patient verbalized understanding of the proper use and possible adverse effects of topical sulfur application.  All of the patient's questions and concerns were addressed.
Zyclara Counseling:  I discussed with the patient the risks of imiquimod including but not limited to erythema, scaling, itching, weeping, crusting, and pain.  Patient understands that the inflammatory response to imiquimod is variable from person to person and was educated regarded proper titration schedule.  If flu-like symptoms develop, patient knows to discontinue the medication and contact us.
Dutasteride Male Counseling: Dustasteride Counseling:  I discussed with the patient the risks of use of dutasteride including but not limited to decreased libido, decreased ejaculate volume, and gynecomastia. Women who can become pregnant should not handle medication.  All of the patient's questions and concerns were addressed.
Mirvaso Pregnancy And Lactation Text: This medication has not been assigned a Pregnancy Risk Category. It is unknown if the medication is excreted in breast milk.
Sotyktu Pregnancy And Lactation Text: There is insufficient data to evaluate whether or not Sotyktu is safe to use during pregnancy.   It is not known if Sotyktu passes into breast milk and whether or not it is safe to use when breastfeeding.  
Azathioprine Pregnancy And Lactation Text: This medication is Pregnancy Category D and isn't considered safe during pregnancy. It is unknown if this medication is excreted in breast milk.
Xolair Counseling:  Patient informed of potential adverse effects including but not limited to fever, muscle aches, rash and allergic reactions.  The patient verbalized understanding of the proper use and possible adverse effects of Xolair.  All of the patient's questions and concerns were addressed.
Carac Counseling:  I discussed with the patient the risks of Carac including but not limited to erythema, scaling, itching, weeping, crusting, and pain.
Metronidazole Pregnancy And Lactation Text: This medication is Pregnancy Category B and considered safe during pregnancy.  It is also excreted in breast milk.
Glycopyrrolate Pregnancy And Lactation Text: This medication is Pregnancy Category B and is considered safe during pregnancy. It is unknown if it is excreted breast milk.
Prednisone Counseling:  I discussed with the patient the risks of prolonged use of prednisone including but not limited to weight gain, insomnia, osteoporosis, mood changes, diabetes, susceptibility to infection, glaucoma and high blood pressure.  In cases where prednisone use is prolonged, patients should be monitored with blood pressure checks, serum glucose levels and an eye exam.  Additionally, the patient may need to be placed on GI prophylaxis, PCP prophylaxis, and calcium and vitamin D supplementation and/or a bisphosphonate.  The patient verbalized understanding of the proper use and the possible adverse effects of prednisone.  All of the patient's questions and concerns were addressed.
Rhofade Counseling: Rhofade is a topical medication which can decrease superficial blood flow where applied. Side effects are uncommon and include stinging, redness and allergic reactions.
Olanzapine Pregnancy And Lactation Text: This medication is pregnancy category C.   There are no adequate and well controlled trials with olanzapine in pregnant females.  Olanzapine should be used during pregnancy only if the potential benefit justifies the potential risk to the fetus.   In a study in lactating healthy women, olanzapine was excreted in breast milk.  It is recommended that women taking olanzapine should not breast feed.
Terbinafine Counseling: Patient counseling regarding adverse effects of terbinafine including but not limited to headache, diarrhea, rash, upset stomach, liver function test abnormalities, itching, taste/smell disturbance, nausea, abdominal pain, and flatulence.  There is a rare possibility of liver failure that can occur when taking terbinafine.  The patient understands that a baseline LFT and kidney function test may be required. The patient verbalized understanding of the proper use and possible adverse effects of terbinafine.  All of the patient's questions and concerns were addressed.
Topical Clindamycin Counseling: Patient counseled that this medication may cause skin irritation or allergic reactions.  In the event of skin irritation, the patient was advised to reduce the amount of the drug applied or use it less frequently.   The patient verbalized understanding of the proper use and possible adverse effects of clindamycin.  All of the patient's questions and concerns were addressed.
Minocycline Counseling: Patient advised regarding possible photosensitivity and discoloration of the teeth, skin, lips, tongue and gums.  Patient instructed to avoid sunlight, if possible.  When exposed to sunlight, patients should wear protective clothing, sunglasses, and sunscreen.  The patient was instructed to call the office immediately if the following severe adverse effects occur:  hearing changes, easy bruising/bleeding, severe headache, or vision changes.  The patient verbalized understanding of the proper use and possible adverse effects of minocycline.  All of the patient's questions and concerns were addressed.
Bactrim Counseling:  I discussed with the patient the risks of sulfa antibiotics including but not limited to GI upset, allergic reaction, drug rash, diarrhea, dizziness, photosensitivity, and yeast infections.  Rarely, more serious reactions can occur including but not limited to aplastic anemia, agranulocytosis, methemoglobinemia, blood dyscrasias, liver or kidney failure, lung infiltrates or desquamative/blistering drug rashes.
SSKI Counseling:  I discussed with the patient the risks of SSKI including but not limited to thyroid abnormalities, metallic taste, GI upset, fever, headache, acne, arthralgias, paraesthesias, lymphadenopathy, easy bleeding, arrhythmias, and allergic reaction.
Topical Sulfur Applications Pregnancy And Lactation Text: This medication is considered safe during pregnancy and breast feeding secondary to limited systemic absorption.
Infliximab Counseling:  I discussed with the patient the risks of infliximab including but not limited to myelosuppression, immunosuppression, autoimmune hepatitis, demyelinating diseases, lymphoma, and serious infections.  The patient understands that monitoring is required including a PPD at baseline and must alert us or the primary physician if symptoms of infection or other concerning signs are noted.
High Dose Vitamin A Counseling: Side effects reviewed, pt to contact office should one occur.
Cantharidin Counseling:  I discussed with the patient the risks of Cantharidin including but not limited to pain, redness, burning, itching, and blistering.
Albendazole Counseling:  I discussed with the patient the risks of albendazole including but not limited to cytopenia, kidney damage, nausea/vomiting and severe allergy.  The patient understands that this medication is being used in an off-label manner.
Hydroquinone Counseling:  Patient advised that medication may result in skin irritation, lightening (hypopigmentation), dryness, and burning.  In the event of skin irritation, the patient was advised to reduce the amount of the drug applied or use it less frequently.  Rarely, spots that are treated with hydroquinone can become darker (pseudoochronosis).  Should this occur, patient instructed to stop medication and call the office. The patient verbalized understanding of the proper use and possible adverse effects of hydroquinone.  All of the patient's questions and concerns were addressed.
Xolair Pregnancy And Lactation Text: This medication is Pregnancy Category B and is considered safe during pregnancy. This medication is excreted in breast milk.
High Dose Vitamin A Pregnancy And Lactation Text: High dose vitamin A therapy is contraindicated during pregnancy and breast feeding.
Wartpeel Counseling:  I discussed with the patient the risks of Wartpeel including but not limited to erythema, scaling, itching, weeping, crusting, and pain.
Xeljanz Counseling: I discussed with the patient the risks of Xeljanz therapy including increased risk of infection, liver issues, headache, diarrhea, or cold symptoms. Live vaccines should be avoided. They were instructed to call if they have any problems.
Albendazole Pregnancy And Lactation Text: This medication is Pregnancy Category C and it isn't known if it is safe during pregnancy. It is also excreted in breast milk.
Sski Pregnancy And Lactation Text: This medication is Pregnancy Category D and isn't considered safe during pregnancy. It is excreted in breast milk.
Bactrim Pregnancy And Lactation Text: This medication is Pregnancy Category D and is known to cause fetal risk.  It is also excreted in breast milk.
Hydroxychloroquine Counseling:  I discussed with the patient that a baseline ophthalmologic exam is needed at the start of therapy and every year thereafter while on therapy. A CBC may also be warranted for monitoring.  The side effects of this medication were discussed with the patient, including but not limited to agranulocytosis, aplastic anemia, seizures, rashes, retinopathy, and liver toxicity. Patient instructed to call the office should any adverse effect occur.  The patient verbalized understanding of the proper use and possible adverse effects of Plaquenil.  All the patient's questions and concerns were addressed.
Carac Pregnancy And Lactation Text: This medication is Pregnancy Category X and contraindicated in pregnancy and in women who may become pregnant. It is unknown if this medication is excreted in breast milk.
Cellcept Counseling:  I discussed with the patient the risks of mycophenolate mofetil including but not limited to infection/immunosuppression, GI upset, hypokalemia, hypercholesterolemia, bone marrow suppression, lymphoproliferative disorders, malignancy, GI ulceration/bleed/perforation, colitis, interstitial lung disease, kidney failure, progressive multifocal leukoencephalopathy, and birth defects.  The patient understands that monitoring is required including a baseline creatinine and regular CBC testing. In addition, patient must alert us immediately if symptoms of infection or other concerning signs are noted.
Dutasteride Pregnancy And Lactation Text: This medication is absolutely contraindicated in women, especially during pregnancy and breast feeding. Feminization of male fetuses is possible if taking while pregnant.
Opzelura Counseling:  I discussed with the patient the risks of Opzelura including but not limited to nasopharngitis, bronchitis, ear infection, eosinophila, hives, diarrhea, folliculitis, tonsillitis, and rhinorrhea.  Taken orally, this medication has been linked to serious infections; higher rate of mortality; malignancy and lymphoproliferative disorders; major adverse cardiovascular events; thrombosis; thrombocytopenia, anemia, and neutropenia; and lipid elevations.
Stelara Counseling:  I discussed with the patient the risks of ustekinumab including but not limited to immunosuppression, malignancy, posterior leukoencephalopathy syndrome, and serious infections.  The patient understands that monitoring is required including a PPD at baseline and must alert us or the primary physician if symptoms of infection or other concerning signs are noted.
Cantharidin Pregnancy And Lactation Text: This medication has not been proven safe during pregnancy. It is unknown if this medication is excreted in breast milk.
Solaraze Counseling:  I discussed with the patient the risks of Solaraze including but not limited to erythema, scaling, itching, weeping, crusting, and pain.
Hydroxychloroquine Pregnancy And Lactation Text: This medication has been shown to cause fetal harm but it isn't assigned a Pregnancy Risk Category. There are small amounts excreted in breast milk.
Finasteride Male Counseling: Finasteride Counseling:  I discussed with the patient the risks of use of finasteride including but not limited to decreased libido, decreased ejaculate volume, gynecomastia, and depression. Women should not handle medication.  All of the patient's questions and concerns were addressed.
Fluconazole Counseling:  Patient counseled regarding adverse effects of fluconazole including but not limited to headache, diarrhea, nausea, upset stomach, liver function test abnormalities, taste disturbance, and stomach pain.  There is a rare possibility of liver failure that can occur when taking fluconazole.  The patient understands that monitoring of LFTs and kidney function test may be required, especially at baseline. The patient verbalized understanding of the proper use and possible adverse effects of fluconazole.  All of the patient's questions and concerns were addressed.
5-Fu Counseling: 5-Fluorouracil Counseling:  I discussed with the patient the risks of 5-fluorouracil including but not limited to erythema, scaling, itching, weeping, crusting, and pain.
Oral Minoxidil Counseling- I discussed with the patient the risks of oral minoxidil including but not limited to shortness of breath, swelling of the feet or ankles, dizziness, lightheadedness, unwanted hair growth and allergic reaction.  The patient verbalized understanding of the proper use and possible adverse effects of oral minoxidil.  All of the patient's questions and concerns were addressed.
Terbinafine Pregnancy And Lactation Text: This medication is Pregnancy Category B and is considered safe during pregnancy. It is also excreted in breast milk and breast feeding isn't recommended.
Dupixent Counseling: I discussed with the patient the risks of dupilumab including but not limited to eye infection and irritation, cold sores, injection site reactions, worsening of asthma, allergic reactions and increased risk of parasitic infection.  Live vaccines should be avoided while taking dupilumab. Dupilumab will also interact with certain medications such as warfarin and cyclosporine. The patient understands that monitoring is required and they must alert us or the primary physician if symptoms of infection or other concerning signs are noted.
Thalidomide Counseling: I discussed with the patient the risks of thalidomide including but not limited to birth defects, anxiety, weakness, chest pain, dizziness, cough and severe allergy.
Ivermectin Counseling:  Patient instructed to take medication on an empty stomach with a full glass of water.  Patient informed of potential adverse effects including but not limited to nausea, diarrhea, dizziness, itching, and swelling of the extremities or lymph nodes.  The patient verbalized understanding of the proper use and possible adverse effects of ivermectin.  All of the patient's questions and concerns were addressed.
Cibinqo Counseling: I discussed with the patient the risks of Cibinqo therapy including but not limited to common cold, nausea, headache, cold sores, increased blood CPK levels, dizziness, UTIs, fatigue, acne, and vomitting. Live vaccines should be avoided.  This medication has been linked to serious infections; higher rate of mortality; malignancy and lymphoproliferative disorders; major adverse cardiovascular events; thrombosis; thrombocytopenia and lymphopenia; lipid elevations; and retinal detachment.
Imiquimod Counseling:  I discussed with the patient the risks of imiquimod including but not limited to erythema, scaling, itching, weeping, crusting, and pain.  Patient understands that the inflammatory response to imiquimod is variable from person to person and was educated regarded proper titration schedule.  If flu-like symptoms develop, patient knows to discontinue the medication and contact us.
Calcipotriene Pregnancy And Lactation Text: The use of this medication during pregnancy or lactation is not recommended as there is insufficient data.
Oral Minoxidil Pregnancy And Lactation Text: This medication should only be used when clearly needed if you are pregnant, attempting to become pregnant or breast feeding.
Opioid Counseling: I discussed with the patient the potential side effects of opioids including but not limited to addiction, altered mental status, and depression. I stressed avoiding alcohol, benzodiazepines, muscle relaxants and sleep aids unless specifically okayed by a physician. The patient verbalized understanding of the proper use and possible adverse effects of opioids. All of the patient's questions and concerns were addressed. They were instructed to flush the remaining pills down the toilet if they did not need them for pain.
Colchicine Counseling:  Patient counseled regarding adverse effects including but not limited to stomach upset (nausea, vomiting, stomach pain, or diarrhea).  Patient instructed to limit alcohol consumption while taking this medication.  Colchicine may reduce blood counts especially with prolonged use.  The patient understands that monitoring of kidney function and blood counts may be required, especially at baseline. The patient verbalized understanding of the proper use and possible adverse effects of colchicine.  All of the patient's questions and concerns were addressed.
Calcipotriene Counseling:  I discussed with the patient the risks of calcipotriene including but not limited to erythema, scaling, itching, and irritation.
Cephalexin Counseling: I counseled the patient regarding use of cephalexin as an antibiotic for prophylactic and/or therapeutic purposes. Cephalexin (commonly prescribed under brand name Keflex) is a cephalosporin antibiotic which is active against numerous classes of bacteria, including most skin bacteria. Side effects may include nausea, diarrhea, gastrointestinal upset, rash, hives, yeast infections, and in rare cases, hepatitis, kidney disease, seizures, fever, confusion, neurologic symptoms, and others. Patients with severe allergies to penicillin medications are cautioned that there is about a 10% incidence of cross-reactivity with cephalosporins. When possible, patients with penicillin allergies should use alternatives to cephalosporins for antibiotic therapy.
Rituxan Counseling:  I discussed with the patient the risks of Rituxan infusions. Side effects can include infusion reactions, severe drug rashes including mucocutaneous reactions, reactivation of latent hepatitis and other infections and rarely progressive multifocal leukoencephalopathy.  All of the patient's questions and concerns were addressed.
Opzelura Pregnancy And Lactation Text: There is insufficient data to evaluate drug-associated risk for major birth defects, miscarriage, or other adverse maternal or fetal outcomes.  There is a pregnancy registry that monitors pregnancy outcomes in pregnant persons exposed to the medication during pregnancy.  It is unknown if this medication is excreted in breast milk.  Do not breastfeed during treatment and for about 4 weeks after the last dose.
Xelrafaz Pregnancy And Lactation Text: This medication is Pregnancy Category D and is not considered safe during pregnancy.  The risk during breast feeding is also uncertain.
Finasteride Pregnancy And Lactation Text: This medication is absolutely contraindicated during pregnancy. It is unknown if it is excreted in breast milk.
Clindamycin Pregnancy And Lactation Text: This medication can be used in pregnancy if certain situations. Clindamycin is also present in breast milk.
Low Dose Naltrexone Counseling- I discussed with the patient the potential risks and side effects of low dose naltrexone including but not limited to: more vivid dreams, headaches, nausea, vomiting, abdominal pain, fatigue, dizziness, and anxiety.
Erivedge Counseling- I discussed with the patient the risks of Erivedge including but not limited to nausea, vomiting, diarrhea, constipation, weight loss, changes in the sense of taste, decreased appetite, muscle spasms, and hair loss.  The patient verbalized understanding of the proper use and possible adverse effects of Erivedge.  All of the patient's questions and concerns were addressed.
Topical Ketoconazole Counseling: Patient counseled that this medication may cause skin irritation or allergic reactions.  In the event of skin irritation, the patient was advised to reduce the amount of the drug applied or use it less frequently.   The patient verbalized understanding of the proper use and possible adverse effects of ketoconazole.  All of the patient's questions and concerns were addressed.
Quinolones Counseling:  I discussed with the patient the risks of fluoroquinolones including but not limited to GI upset, allergic reaction, drug rash, diarrhea, dizziness, photosensitivity, yeast infections, liver function test abnormalities, tendonitis/tendon rupture.
Dupixent Pregnancy And Lactation Text: This medication likely crosses the placenta but the risk for the fetus is uncertain. This medication is excreted in breast milk.
Solaraze Pregnancy And Lactation Text: This medication is Pregnancy Category B and is considered safe. There is some data to suggest avoiding during the third trimester. It is unknown if this medication is excreted in breast milk.
Opioid Pregnancy And Lactation Text: These medications can lead to premature delivery and should be avoided during pregnancy. These medications are also present in breast milk in small amounts.
Enbrel Counseling:  I discussed with the patient the risks of etanercept including but not limited to myelosuppression, immunosuppression, autoimmune hepatitis, demyelinating diseases, lymphoma, and infections.  The patient understands that monitoring is required including a PPD at baseline and must alert us or the primary physician if symptoms of infection or other concerning signs are noted.
Acitretin Counseling:  I discussed with the patient the risks of acitretin including but not limited to hair loss, dry lips/skin/eyes, liver damage, hyperlipidemia, depression/suicidal ideation, photosensitivity.  Serious rare side effects can include but are not limited to pancreatitis, pseudotumor cerebri, bony changes, clot formation/stroke/heart attack.  Patient understands that alcohol is contraindicated since it can result in liver toxicity and significantly prolong the elimination of the drug by many years.
Otezla Counseling: The side effects of Otezla were discussed with the patient, including but not limited to worsening or new depression, weight loss, diarrhea, nausea, upper respiratory tract infection, and headache. Patient instructed to call the office should any adverse effect occur.  The patient verbalized understanding of the proper use and possible adverse effects of Otezla.  All the patient's questions and concerns were addressed.
Cephalexin Pregnancy And Lactation Text: This medication is Pregnancy Category B and considered safe during pregnancy.  It is also excreted in breast milk but can be used safely for shorter doses.
Picato Counseling:  I discussed with the patient the risks of Picato including but not limited to erythema, scaling, itching, weeping, crusting, and pain.
Winlevi Counseling:  I discussed with the patient the risks of topical clascoterone including but not limited to erythema, scaling, itching, and stinging. Patient voiced their understanding.
Cibinqo Pregnancy And Lactation Text: It is unknown if this medication will adversely affect pregnancy or breast feeding.  You should not take this medication if you are currently pregnant or planning a pregnancy or while breastfeeding.
Cimetidine Counseling:  I discussed with the patient the risks of Cimetidine including but not limited to gynecomastia, headache, diarrhea, nausea, drowsiness, arrhythmias, pancreatitis, skin rashes, psychosis, bone marrow suppression and kidney toxicity.
Cyclophosphamide Counseling:  I discussed with the patient the risks of cyclophosphamide including but not limited to hair loss, hormonal abnormalities, decreased fertility, abdominal pain, diarrhea, nausea and vomiting, bone marrow suppression and infection. The patient understands that monitoring is required while taking this medication.
Detail Level: Simple
Aklief counseling:  Patient advised to apply a pea-sized amount only at bedtime and wait 30 minutes after washing their face before applying.  If too drying, patient may add a non-comedogenic moisturizer.  The most commonly reported side effects including irritation, redness, scaling, dryness, stinging, burning, itching, and increased risk of sunburn.  The patient verbalized understanding of the proper use and possible adverse effects of retinoids.  All of the patient's questions and concerns were addressed.
Rituxan Pregnancy And Lactation Text: This medication is Pregnancy Category C and it isn't know if it is safe during pregnancy. It is unknown if this medication is excreted in breast milk but similar antibodies are known to be excreted.
Doxycycline Counseling:  Patient counseled regarding possible photosensitivity and increased risk for sunburn.  Patient instructed to avoid sunlight, if possible.  When exposed to sunlight, patients should wear protective clothing, sunglasses, and sunscreen.  The patient was instructed to call the office immediately if the following severe adverse effects occur:  hearing changes, easy bruising/bleeding, severe headache, or vision changes.  The patient verbalized understanding of the proper use and possible adverse effects of doxycycline.  All of the patient's questions and concerns were addressed.
Clindamycin Counseling: I counseled the patient regarding use of clindamycin as an antibiotic for prophylactic and/or therapeutic purposes. Clindamycin is active against numerous classes of bacteria, including skin bacteria. Side effects may include nausea, diarrhea, gastrointestinal upset, rash, hives, yeast infections, and in rare cases, colitis.
Cyclophosphamide Pregnancy And Lactation Text: This medication is Pregnancy Category D and it isn't considered safe during pregnancy. This medication is excreted in breast milk.
Tranexamic Acid Counseling:  Patient advised of the small risk of bleeding problems with tranexamic acid. They were also instructed to call if they developed any nausea, vomiting or diarrhea. All of the patient's questions and concerns were addressed.
Drysol Counseling:  I discussed with the patient the risks of drysol/aluminum chloride including but not limited to skin rash, itching, irritation, burning.
Low Dose Naltrexone Pregnancy And Lactation Text: Naltrexone is pregnancy category C.  There have been no adequate and well-controlled studies in pregnant women.  It should be used in pregnancy only if the potential benefit justifies the potential risk to the fetus.   Limited data indicates that naltrexone is minimally excreted into breastmilk.
Griseofulvin Counseling:  I discussed with the patient the risks of griseofulvin including but not limited to photosensitivity, cytopenia, liver damage, nausea/vomiting and severe allergy.  The patient understands that this medication is best absorbed when taken with a fatty meal (e.g., ice cream or french fries).
Birth Control Pills Counseling: Birth Control Pill Counseling: I discussed with the patient the potential side effects of OCPs including but not limited to increased risk of stroke, heart attack, thrombophlebitis, deep venous thrombosis, hepatic adenomas, breast changes, GI upset, headaches, and depression.  The patient verbalized understanding of the proper use and possible adverse effects of OCPs. All of the patient's questions and concerns were addressed.
Soolantra Counseling: I discussed with the patients the risks of topial Soolantra. This is a medicine which decreases the number of mites and inflammation in the skin. You experience burning, stinging, eye irritation or allergic reactions.  Please call our office if you develop any problems from using this medication.
Dapsone Counseling: I discussed with the patient the risks of dapsone including but not limited to hemolytic anemia, agranulocytosis, rashes, methemoglobinemia, kidney failure, peripheral neuropathy, headaches, GI upset, and liver toxicity.  Patients who start dapsone require monitoring including baseline LFTs and weekly CBCs for the first month, then every month thereafter.  The patient verbalized understanding of the proper use and possible adverse effects of dapsone.  All of the patient's questions and concerns were addressed.
Otezla Pregnancy And Lactation Text: This medication is Pregnancy Category C and it isn't known if it is safe during pregnancy. It is unknown if it is excreted in breast milk.
Niacinamide Counseling: I recommended taking niacin or niacinamide, also know as vitamin B3, twice daily. Recent evidence suggests that taking vitamin B3 (500 mg twice daily) can reduce the risk of actinic keratoses and non-melanoma skin cancers. Side effects of vitamin B3 include flushing and headache.
Libtayo Counseling- I discussed with the patient the risks of Libtayo including but not limited to nausea, vomiting, diarrhea, and bone or muscle pain.  The patient verbalized understanding of the proper use and possible adverse effects of Libtayo.  All of the patient's questions and concerns were addressed.
Griseofulvin Pregnancy And Lactation Text: This medication is Pregnancy Category X and is known to cause serious birth defects. It is unknown if this medication is excreted in breast milk but breast feeding should be avoided.
Birth Control Pills Pregnancy And Lactation Text: This medication should be avoided if pregnant and for the first 30 days post-partum.
Winlevi Pregnancy And Lactation Text: This medication is considered safe during pregnancy and breastfeeding.
Klisyri Counseling:  I discussed with the patient the risks of Klisyri including but not limited to erythema, scaling, itching, weeping, crusting, and pain.
Siliq Counseling:  I discussed with the patient the risks of Siliq including but not limited to new or worsening depression, suicidal thoughts and behavior, immunosuppression, malignancy, posterior leukoencephalopathy syndrome, and serious infections.  The patient understands that monitoring is required including a PPD at baseline and must alert us or the primary physician if symptoms of infection or other concerning signs are noted. There is also a special program designed to monitor depression which is required with Siliq.
Aklief Pregnancy And Lactation Text: It is unknown if this medication is safe to use during pregnancy.  It is unknown if this medication is excreted in breast milk.  Breastfeeding women should use the topical cream on the smallest area of the skin for the shortest time needed while breastfeeding.  Do not apply to nipple and areola.
Acitretin Pregnancy And Lactation Text: This medication is Pregnancy Category X and should not be given to women who are pregnant or may become pregnant in the future. This medication is excreted in breast milk.
Olumiant Counseling: I discussed with the patient the risks of Olumiant therapy including but not limited to upper respiratory tract infections, shingles, cold sores, and nausea. Live vaccines should be avoided.  This medication has been linked to serious infections; higher rate of mortality; malignancy and lymphoproliferative disorders; major adverse cardiovascular events; thrombosis; gastrointestinal perforations; neutropenia; lymphopenia; anemia; liver enzyme elevations; and lipid elevations.
Topical Metronidazole Counseling: Metronidazole is a topical antibiotic medication. You may experience burning, stinging, redness, or allergic reactions.  Please call our office if you develop any problems from using this medication.
Rifampin Counseling: I discussed with the patient the risks of rifampin including but not limited to liver damage, kidney damage, red-orange body fluids, nausea/vomiting and severe allergy.
Dapsone Pregnancy And Lactation Text: This medication is Pregnancy Category C and is not considered safe during pregnancy or breast feeding.
VTAMA Counseling: I discussed with the patient that VTAMA is not for use in the eyes, mouth or mouth. They should call the office if they develop any signs of allergic reactions to VTAMA. The patient verbalized understanding of the proper use and possible adverse effects of VTAMA.  All of the patient's questions and concerns were addressed.
Olumiant Pregnancy And Lactation Text: Based on animal studies, Olumiant may cause embryo-fetal harm when administered to pregnant women.  The medication should not be used in pregnancy.  Breastfeeding is not recommended during treatment.
Doxycycline Pregnancy And Lactation Text: This medication is Pregnancy Category D and not consider safe during pregnancy. It is also excreted in breast milk but is considered safe for shorter treatment courses.
Azelaic Acid Counseling: Patient counseled that medicine may cause skin irritation and to avoid applying near the eyes.  In the event of skin irritation, the patient was advised to reduce the amount of the drug applied or use it less frequently.   The patient verbalized understanding of the proper use and possible adverse effects of azelaic acid.  All of the patient's questions and concerns were addressed.
Taltz Counseling: I discussed with the patient the risks of ixekizumab including but not limited to immunosuppression, serious infections, worsening of inflammatory bowel disease and drug reactions.  The patient understands that monitoring is required including a PPD at baseline and must alert us or the primary physician if symptoms of infection or other concerning signs are noted.
Tranexamic Acid Pregnancy And Lactation Text: It is unknown if this medication is safe during pregnancy or breast feeding.
Arava Counseling:  Patient counseled regarding adverse effects of Arava including but not limited to nausea, vomiting, abnormalities in liver function tests. Patients may develop mouth sores, rash, diarrhea, and abnormalities in blood counts. The patient understands that monitoring is required including LFTs and blood counts.  There is a rare possibility of scarring of the liver and lung problems that can occur when taking methotrexate. Persistent nausea, loss of appetite, pale stools, dark urine, cough, and shortness of breath should be reported immediately. Patient advised to discontinue Arava treatment and consult with a physician prior to attempting conception. The patient will have to undergo a treatment to eliminate Arava from the body prior to conception.
Soolantra Pregnancy And Lactation Text: This medication is Pregnancy Category C. This medication is considered safe during breast feeding.
Adbry Counseling: I discussed with the patient the risks of tralokinumab including but not limited to eye infection and irritation, cold sores, injection site reactions, worsening of asthma, allergic reactions and increased risk of parasitic infection.  Live vaccines should be avoided while taking tralokinumab. The patient understands that monitoring is required and they must alert us or the primary physician if symptoms of infection or other concerning signs are noted.
Cyclosporine Counseling:  I discussed with the patient the risks of cyclosporine including but not limited to hypertension, gingival hyperplasia,myelosuppression, immunosuppression, liver damage, kidney damage, neurotoxicity, lymphoma, and serious infections. The patient understands that monitoring is required including baseline blood pressure, CBC, CMP, lipid panel and uric acid, and then 1-2 times monthly CMP and blood pressure.
Protopic Counseling: Patient may experience a mild burning sensation during topical application. Protopic is not approved in children less than 2 years of age. There have been case reports of hematologic and skin malignancies in patients using topical calcineurin inhibitors although causality is questionable.
Doxepin Counseling:  Patient advised that the medication is sedating and not to drive a car after taking this medication. Patient informed of potential adverse effects including but not limited to dry mouth, urinary retention, and blurry vision.  The patient verbalized understanding of the proper use and possible adverse effects of doxepin.  All of the patient's questions and concerns were addressed.

## 2023-08-03 NOTE — PROCEDURE: BENIGN DESTRUCTION COSMETIC
Detail Level: Detailed
Price (Use Numbers Only, No Special Characters Or $): 131.0
Anesthesia Type: 1% lidocaine without epinephrine
Anesthesia Volume In Cc: 0.5
Post-Care Instructions: I reviewed with the patient in detail post-care instructions. Patient is to wear sunprotection, and avoid picking at any of the treated lesions. Pt may apply Vaseline to crusted or scabbing areas.
Consent: The patient's consent was obtained including but not limited to risks of crusting, scabbing, blistering, scarring, darker or lighter pigmentary change, recurrence, incomplete removal and infection.

## 2023-08-05 ENCOUNTER — HOSPITAL ENCOUNTER (OUTPATIENT)
Dept: LAB | Facility: MEDICAL CENTER | Age: 53
End: 2023-08-05
Attending: FAMILY MEDICINE
Payer: COMMERCIAL

## 2023-08-05 DIAGNOSIS — E78.2 MIXED HYPERLIPIDEMIA: ICD-10-CM

## 2023-08-05 DIAGNOSIS — E66.09 CLASS 1 OBESITY DUE TO EXCESS CALORIES WITHOUT SERIOUS COMORBIDITY WITH BODY MASS INDEX (BMI) OF 34.0 TO 34.9 IN ADULT: ICD-10-CM

## 2023-08-05 DIAGNOSIS — E55.9 VITAMIN D DEFICIENCY: ICD-10-CM

## 2023-08-05 DIAGNOSIS — R73.03 PREDIABETES: ICD-10-CM

## 2023-08-05 LAB
25(OH)D3 SERPL-MCNC: 24 NG/ML (ref 30–100)
ALBUMIN SERPL BCP-MCNC: 4.3 G/DL (ref 3.2–4.9)
ALBUMIN/GLOB SERPL: 1.2 G/DL
ALP SERPL-CCNC: 88 U/L (ref 30–99)
ALT SERPL-CCNC: 26 U/L (ref 2–50)
ANION GAP SERPL CALC-SCNC: 10 MMOL/L (ref 7–16)
AST SERPL-CCNC: 21 U/L (ref 12–45)
BASOPHILS # BLD AUTO: 0.7 % (ref 0–1.8)
BASOPHILS # BLD: 0.07 K/UL (ref 0–0.12)
BILIRUB SERPL-MCNC: 0.4 MG/DL (ref 0.1–1.5)
BUN SERPL-MCNC: 14 MG/DL (ref 8–22)
CALCIUM ALBUM COR SERPL-MCNC: 9.2 MG/DL (ref 8.5–10.5)
CALCIUM SERPL-MCNC: 9.4 MG/DL (ref 8.5–10.5)
CHLORIDE SERPL-SCNC: 104 MMOL/L (ref 96–112)
CHOLEST SERPL-MCNC: 230 MG/DL (ref 100–199)
CO2 SERPL-SCNC: 24 MMOL/L (ref 20–33)
CREAT SERPL-MCNC: 0.85 MG/DL (ref 0.5–1.4)
EOSINOPHIL # BLD AUTO: 0.32 K/UL (ref 0–0.51)
EOSINOPHIL NFR BLD: 3.4 % (ref 0–6.9)
ERYTHROCYTE [DISTWIDTH] IN BLOOD BY AUTOMATED COUNT: 43.7 FL (ref 35.9–50)
EST. AVERAGE GLUCOSE BLD GHB EST-MCNC: 140 MG/DL
FASTING STATUS PATIENT QL REPORTED: NORMAL
GFR SERPLBLD CREATININE-BSD FMLA CKD-EPI: 103 ML/MIN/1.73 M 2
GLOBULIN SER CALC-MCNC: 3.5 G/DL (ref 1.9–3.5)
GLUCOSE SERPL-MCNC: 132 MG/DL (ref 65–99)
HBA1C MFR BLD: 6.5 % (ref 4–5.6)
HCT VFR BLD AUTO: 56.1 % (ref 42–52)
HDLC SERPL-MCNC: 28 MG/DL
HGB BLD-MCNC: 18 G/DL (ref 14–18)
IMM GRANULOCYTES # BLD AUTO: 0.12 K/UL (ref 0–0.11)
IMM GRANULOCYTES NFR BLD AUTO: 1.3 % (ref 0–0.9)
LDLC SERPL CALC-MCNC: 159 MG/DL
LYMPHOCYTES # BLD AUTO: 3.22 K/UL (ref 1–4.8)
LYMPHOCYTES NFR BLD: 34.4 % (ref 22–41)
MCH RBC QN AUTO: 27.6 PG (ref 27–33)
MCHC RBC AUTO-ENTMCNC: 32.1 G/DL (ref 32.3–36.5)
MCV RBC AUTO: 86 FL (ref 81.4–97.8)
MONOCYTES # BLD AUTO: 0.54 K/UL (ref 0–0.85)
MONOCYTES NFR BLD AUTO: 5.8 % (ref 0–13.4)
NEUTROPHILS # BLD AUTO: 5.08 K/UL (ref 1.82–7.42)
NEUTROPHILS NFR BLD: 54.4 % (ref 44–72)
NRBC # BLD AUTO: 0 K/UL
NRBC BLD-RTO: 0 /100 WBC (ref 0–0.2)
PLATELET # BLD AUTO: 342 K/UL (ref 164–446)
PMV BLD AUTO: 9.7 FL (ref 9–12.9)
POTASSIUM SERPL-SCNC: 4.5 MMOL/L (ref 3.6–5.5)
PROT SERPL-MCNC: 7.8 G/DL (ref 6–8.2)
RBC # BLD AUTO: 6.52 M/UL (ref 4.7–6.1)
SODIUM SERPL-SCNC: 138 MMOL/L (ref 135–145)
TRIGL SERPL-MCNC: 213 MG/DL (ref 0–149)
TSH SERPL DL<=0.005 MIU/L-ACNC: 1.99 UIU/ML (ref 0.38–5.33)
WBC # BLD AUTO: 9.4 K/UL (ref 4.8–10.8)

## 2023-08-05 PROCEDURE — 36415 COLL VENOUS BLD VENIPUNCTURE: CPT

## 2023-08-05 PROCEDURE — 80053 COMPREHEN METABOLIC PANEL: CPT

## 2023-08-05 PROCEDURE — 80061 LIPID PANEL: CPT

## 2023-08-05 PROCEDURE — 84443 ASSAY THYROID STIM HORMONE: CPT

## 2023-08-05 PROCEDURE — 83036 HEMOGLOBIN GLYCOSYLATED A1C: CPT

## 2023-08-05 PROCEDURE — 85025 COMPLETE CBC W/AUTO DIFF WBC: CPT

## 2023-08-05 PROCEDURE — 82306 VITAMIN D 25 HYDROXY: CPT

## 2023-08-17 ENCOUNTER — PHYSICAL THERAPY (OUTPATIENT)
Dept: PHYSICAL THERAPY | Facility: REHABILITATION | Age: 53
End: 2023-08-17
Attending: FAMILY MEDICINE
Payer: COMMERCIAL

## 2023-08-17 DIAGNOSIS — M25.512 ACUTE PAIN OF LEFT SHOULDER: ICD-10-CM

## 2023-08-17 PROCEDURE — 97161 PT EVAL LOW COMPLEX 20 MIN: CPT

## 2023-08-17 PROCEDURE — 97140 MANUAL THERAPY 1/> REGIONS: CPT

## 2023-08-17 PROCEDURE — 97110 THERAPEUTIC EXERCISES: CPT

## 2023-08-17 NOTE — OP THERAPY EVALUATION
"  Outpatient Physical Therapy  INITIAL EVALUATION    [facility] [place] [id number] [facility], [address]  [place] [address]  Phone:  [phone]    Date of Evaluation: 08/17/2023    Patient: Curly Bedolla  YOB: 1970  MRN: [id number]     Referring Provider: Parker Rivas M.D.  [facility] [facility] [address]  [place] [address]   Referring Diagnosis Pain in left shoulder [M25.512];Other chronic pain [G89.29]     Time Calculation  Start time: 0300  Stop time: 0350 Time Calculation (min): 50 minutes         Chief Complaint: No chief complaint on file.    Visit Diagnoses     ICD-10-CM   1. Acute pain of left shoulder  M25.512       Date of onset of impairment: 8/17/2022    Subjective:   History of Present Illness:     Mechanism of injury:  CMHx: pt notes worsening L shoulder pain; he noted that the original injury happened in 2019 during xfit with a pull up. This seemed to originally heal a little but since then has developed worsening shoulder pain. He notes that prior PCP checked things and told him his arm moves well be he notes that some movements really do hurt. They discussed a potential cortisone shot.     Pain Behavior  Sxs: slightly deep anterior/superior shoulder pain    Aggs: reaching OH, especially if using weights and doesn't feel as strong but more due to lack of working out, swimming, lying on the L shoulder    Eas: unsure    24 hour: none    Sleep: a couple days per week    Irritability: lower to mod    Yellow flags: \"lifting?\"    Imaging: xrays neg    PMHx: none    Profession/Recreation: walks and runs a few days per week, working on computer    Goals: get back to no issues and weightlift                            Past Medical History:   Diagnosis Date    Obesity      No past surgical history on file.  Social History     Tobacco Use    Smoking status: Never    Smokeless tobacco: Never   Substance Use Topics    Alcohol use: Not Currently     Family and " "Occupational History     Socioeconomic History    Marital status:      Spouse name: Not on file    Number of children: Not on file    Years of education: Not on file    Highest education level: Bachelor's degree (e.g., BA, AB, BS)   Occupational History    Not on file       Objective     General Comments     Shoulder Comments   Shoulder AROM  Flexion: pain at top  Abduction:  ER: slight sxs ant sh end range   FIR: L1, T8      MMT  Elbow flexion: 5*/5 , 5/5  Elbow extension: 5/5  Shoulder ER: 5*/5, 5/5  Shoulder IR: 5/5 ea  Shoulder ABD: 5/5 ea  Shoulder flex: 4+/5 ea    T/S ROM  Extension: 25% WFL  R rot: WFL  L rot: WFL    Supine:  PROM all WFL no pain  Flexion:  Abduction:  ER:  IR:  90/90 ER:  90/90 IR:          Therapeutic Exercises (CPT 46897):     1. UPOC 10/17/23      Therapeutic Exercise Summary: Home Exercise Program Created and Reviewed with patient    Thoracic extension over chair x15 (dec sxs after this and manual with OH/ER)  Shoulder ER BTB 2x1' holds  S/L ER 3\" ecc 2x\"fatigue\"      Therapeutic Treatments and Modalities:     1. Manual Therapy (CPT 73238), GIII P-As T/S    Time-based treatments/modalities:    Physical Therapy Timed Treatment Charges  Manual therapy minutes (CPT 06715): 10 minutes  Therapeutic exercise minutes (CPT 50919): 15 minutes      Assessment, Response and Plan:   Impairments: impaired functional mobility, lacks appropriate home exercise program and pain with function    Assessment details:  PT finds s/sx consistent with RTC/subacromial pain syndrome with muscle power deficits. This is evident with painful AROM decreased with passive support, painful isometric muscle testing in neutral positions, and subjective description/location of symptoms. He can benefit from skilled PT care in order to address the above deficits.   Barriers to therapy:  Comorbidities  Prognosis: good    Goals:   Short Term Goals:   -pt meets MCID for RMQ  -pt has painfree AROM other than HBB to " improve all IADLs and reaching without issues  -pt with RTC/scap program to improve strength at shoulder  -pt with ability to sleep with waking only 1-2x/week at most due to lying on L shoulder  Short term goal time span:  2-4 weeks      Long Term Goals:    -pt meets MCID for RMQ  -pt has painfree AROM other than HBB to improve all IADLs and reaching without issues  -pt with RTC/scap program and gym program without pain to meet goal of working out without issues  -pt with ability to sleep without waking due to lying on L shoulder  Long term goal time span:  6-8 weeks    Plan:   Therapy options:  Physical therapy treatment to continue  Planned therapy interventions:  E Stim Attended (CPT 16874), E Stim Unattended (CPT 69135), Manual Therapy (CPT 63889), Neuromuscular Re-education (CPT 08262), Hot or Cold Pack Therapy (CPT 78326), Therapeutic Activities (CPT 99577) and Therapeutic Exercise (CPT 35964)  Frequency:  2x week  Duration in weeks:  8  Duration in visits:  16  Discussed with:  Patient      Functional Assessment Used        Referring provider co-signature:  I have reviewed this plan of care and my co-signature certifies the need for services.    Certification Period: 08/17/2023 to  10/19/23    Physician Signature: ________________________________ Date: ______________

## 2023-08-23 ENCOUNTER — OFFICE VISIT (OUTPATIENT)
Dept: MEDICAL GROUP | Facility: PHYSICIAN GROUP | Age: 53
End: 2023-08-23
Payer: COMMERCIAL

## 2023-08-23 VITALS
RESPIRATION RATE: 14 BRPM | OXYGEN SATURATION: 91 % | TEMPERATURE: 97.7 F | DIASTOLIC BLOOD PRESSURE: 80 MMHG | WEIGHT: 204 LBS | SYSTOLIC BLOOD PRESSURE: 138 MMHG | HEART RATE: 77 BPM | HEIGHT: 65 IN | BODY MASS INDEX: 33.99 KG/M2

## 2023-08-23 DIAGNOSIS — R82.998 DARK URINE: ICD-10-CM

## 2023-08-23 DIAGNOSIS — G89.29 CHRONIC LEFT SHOULDER PAIN: ICD-10-CM

## 2023-08-23 DIAGNOSIS — R73.03 PREDIABETES: ICD-10-CM

## 2023-08-23 DIAGNOSIS — R73.9 HYPERGLYCEMIA: ICD-10-CM

## 2023-08-23 DIAGNOSIS — M25.512 CHRONIC LEFT SHOULDER PAIN: ICD-10-CM

## 2023-08-23 DIAGNOSIS — E55.9 VITAMIN D DEFICIENCY: ICD-10-CM

## 2023-08-23 DIAGNOSIS — E78.2 MIXED HYPERLIPIDEMIA: ICD-10-CM

## 2023-08-23 PROCEDURE — 3075F SYST BP GE 130 - 139MM HG: CPT | Performed by: FAMILY MEDICINE

## 2023-08-23 PROCEDURE — 3079F DIAST BP 80-89 MM HG: CPT | Performed by: FAMILY MEDICINE

## 2023-08-23 PROCEDURE — 99214 OFFICE O/P EST MOD 30 MIN: CPT | Mod: 25 | Performed by: FAMILY MEDICINE

## 2023-08-23 PROCEDURE — 20610 DRAIN/INJ JOINT/BURSA W/O US: CPT | Mod: LT | Performed by: FAMILY MEDICINE

## 2023-08-23 RX ORDER — TRIAMCINOLONE ACETONIDE 40 MG/ML
40 INJECTION, SUSPENSION INTRA-ARTICULAR; INTRAMUSCULAR ONCE
Status: COMPLETED | OUTPATIENT
Start: 2023-08-23 | End: 2023-08-23

## 2023-08-23 RX ORDER — ROSUVASTATIN CALCIUM 10 MG/1
10 TABLET, COATED ORAL EVERY EVENING
Qty: 90 TABLET | Refills: 3 | Status: SHIPPED | OUTPATIENT
Start: 2023-08-23

## 2023-08-23 RX ORDER — ERGOCALCIFEROL 1.25 MG/1
50000 CAPSULE ORAL
Qty: 12 CAPSULE | Refills: 3 | Status: SHIPPED | OUTPATIENT
Start: 2023-08-23

## 2023-08-23 RX ADMIN — TRIAMCINOLONE ACETONIDE 40 MG: 40 INJECTION, SUSPENSION INTRA-ARTICULAR; INTRAMUSCULAR at 15:52

## 2023-08-23 ASSESSMENT — FIBROSIS 4 INDEX: FIB4 SCORE: 0.65

## 2023-08-23 NOTE — PROGRESS NOTES
"CHIEF COMPLAINT / REASON FOR VISIT  Curly Bedolla is a 52 y.o. male that presents today for left shoulder f/u    HISTORY OF PRESENT ILLNESS  Left shoulder pain, doing PT    Started counting calories    Started using oral appliance which has fixed snoring    OBJECTIVE     /80 (BP Location: Left arm, Patient Position: Sitting, BP Cuff Size: Adult)   Pulse 77   Temp 36.5 °C (97.7 °F) (Temporal)   Resp 14   Ht 1.651 m (5' 5\")   Wt 92.5 kg (204 lb)   SpO2 91%   BMI 33.95 kg/m²  Body mass index is 33.95 kg/m².    PHYSICAL EXAM  Constitutional: Sitting comfortably, in no acute distress, responds to questions appropriately.  Skin: Warm and dry, no rashes or lesions on face or exposed upper extremities    ASSESSMENT & PLAN  1. Chronic left shoulder pain  Left shoulder rotator cuff tendinopathy, uncontrolled.  Currently doing physical therapy.  After discussion decided proceed with subacromial steroid injection.  See procedure note  - triamcinolone acetonide (Kenalog-40) injection 40 mg  - Joint Inj - LG: shoulder, L subacromial bursa    2. Prediabetes  3. Hyperglycemia  Had extensive discussion regarding diabetes and blood glucose control.  We will recheck A1c in 3 months and if A1c is 6.5 or higher this would confirm diabetes diagnosis.  He will enact lifestyle changes  - MICROALBUMIN CREAT RATIO URINE; Future  - HEMOGLOBIN A1C; Future  - Blood Glucose; Future  - URINALYSIS,CULTURE IF INDICATED; Future    4. Dark urine  Reports dark urine over several months.  Obtain urinalysis and urine  albumin/creatinine ratio  - MICROALBUMIN CREAT RATIO URINE; Future  - URINALYSIS,CULTURE IF INDICATED; Future    5. Vitamin D deficiency  Persistent vitamin D deficiency, will treat with 50,000 units weekly.  - vitamin D2, Ergocalciferol, (DRISDOL) 1.25 MG (10264 UT) Cap capsule; Take 1 Capsule by mouth every 7 days.  Dispense: 12 Capsule; Refill: 3  - VITAMIN D,25 HYDROXY (DEFICIENCY); Future    6. Mixed " hyperlipidemia  Chronic, uncontrolled, after discussion decided to initiate rosuvastatin 10 mg daily for ASCVD risk reduction.  - rosuvastatin (CRESTOR) 10 MG Tab; Take 1 Tablet by mouth every evening.  Dispense: 90 Tablet; Refill: 3  - Lipid Profile; Future    Follow-up in 3 months, after repeat lab work

## 2023-08-23 NOTE — PROCEDURES
Joint Inj - LG: shoulder, L subacromial bursa on 8/23/2023 2:00 PM  Details: (27 G, 1.5 in) needle, posterior approach  Medications: (Triamcinolone acetonide 40 mg - 1 mL  Lidocaine 2% - 4 mL  )  Outcome: tolerated well, no immediate complications  Procedure, treatment alternatives, risks and benefits explained, specific risks discussed. Immediately prior to procedure a time out was called to verify the correct patient, procedure, equipment, support staff and site/side marked as required. Patient was prepped and draped in the usual sterile fashion.

## 2023-08-30 ENCOUNTER — APPOINTMENT (OUTPATIENT)
Dept: PHYSICAL THERAPY | Facility: REHABILITATION | Age: 53
End: 2023-08-30
Payer: COMMERCIAL

## 2023-09-13 ENCOUNTER — PHYSICAL THERAPY (OUTPATIENT)
Dept: PHYSICAL THERAPY | Facility: REHABILITATION | Age: 53
End: 2023-09-13
Attending: FAMILY MEDICINE
Payer: COMMERCIAL

## 2023-09-13 DIAGNOSIS — M25.512 ACUTE PAIN OF LEFT SHOULDER: ICD-10-CM

## 2023-09-13 PROCEDURE — 97110 THERAPEUTIC EXERCISES: CPT

## 2023-09-15 ENCOUNTER — APPOINTMENT (OUTPATIENT)
Dept: PHYSICAL THERAPY | Facility: REHABILITATION | Age: 53
End: 2023-09-15
Attending: FAMILY MEDICINE
Payer: COMMERCIAL

## 2023-09-20 ENCOUNTER — APPOINTMENT (OUTPATIENT)
Dept: PHYSICAL THERAPY | Facility: REHABILITATION | Age: 53
End: 2023-09-20
Attending: FAMILY MEDICINE
Payer: COMMERCIAL

## 2023-09-22 ENCOUNTER — APPOINTMENT (OUTPATIENT)
Dept: PHYSICAL THERAPY | Facility: REHABILITATION | Age: 53
End: 2023-09-22
Attending: FAMILY MEDICINE
Payer: COMMERCIAL

## 2023-09-27 ENCOUNTER — APPOINTMENT (OUTPATIENT)
Dept: PHYSICAL THERAPY | Facility: REHABILITATION | Age: 53
End: 2023-09-27
Attending: FAMILY MEDICINE
Payer: COMMERCIAL

## 2023-09-29 ENCOUNTER — APPOINTMENT (OUTPATIENT)
Dept: PHYSICAL THERAPY | Facility: REHABILITATION | Age: 53
End: 2023-09-29
Attending: FAMILY MEDICINE
Payer: COMMERCIAL

## 2023-10-02 NOTE — OP THERAPY DAILY TREATMENT
"  Outpatient Physical Therapy  DAILY TREATMENT     [facility] [place]  [address] [facility], [address]  [place] [address]  Phone:  [phone]    Date: 10/04/2023  Patient: Curly Bedolla  YOB: 1970  MRN: [id number]   Time Calculation             Chief Complaint: No chief complaint on file.    Visit #: 3    SUBJECTIVE: Pt notes he is having some issues with his L shoulder after PT last time. He notes that he hasn't been to the MD yet on this.     Sxs: slightly deep anterior/superior shoulder pain     Aggs:   -reaching OH, especially if using weights and doesn't feel as strong but more due to lack of working out, -swimming,   -lying on the L shoulder; sleep wakes a couple times per night     OBJECTIVE:   Current objective measures:   Shoulder AROM; PROM is WFL  Flexion: pain at top minor*  ER: no sxs  FIR: T11*, T8    MMT  Elbow flexion, ER/IR 5/5  Shoulder flexion 5*/5, 5/5  Shoulder abduction 5/5 ea    T/S ROM  Extension: 25% WFL    HHD R/L   R 100lbs  L 90 lbs        Therapeutic Exercises (CPT 70536):     1. UPOC 10/17/23    2. bench rows, 40# 2x15 ea    3. landmine pres, 45#BB 2x15 ea, not today    4. lyons carries, 40# 2L x200ft    6. S/L RTC trio, 4#ER/ABD, 2# flexion 2xfatigue, no flexion today    7. scaption, OTB 2x10      Therapeutic Exercise Summary:     Therapeutic Exercise Summary: Home Exercise Program Created and Reviewed with patient     Thoracic extension over chair x15 (dec sxs after this and manual with OH/ER)  Shoulder ER BTB 2x1' holds  S/L ER 3\" ecc 2x\"fatigue\"       Therapeutic Treatments and Modalities:     1. Manual Therapy (CPT 18935), post cuff release, GIV T/S mobs mid to upper  Time-based treatments/modalities:     ASSESSMENT: Pt with improving AROM still some sxs at top of OH elevation. Also with sxs pressing. Recommend he pause workouts with pushing for now or modifications while we cont to progress his overall strength and functional scapular " progressions.     PLAN/RECOMMENDATIONS:   Plan for treatment: therapy treatment to continue next visit.  Planned interventions for next visit: continue with current treatment.

## 2023-10-04 ENCOUNTER — APPOINTMENT (OUTPATIENT)
Dept: PHYSICAL THERAPY | Facility: REHABILITATION | Age: 53
End: 2023-10-04
Attending: FAMILY MEDICINE
Payer: COMMERCIAL

## 2023-10-04 DIAGNOSIS — M25.512 ACUTE PAIN OF LEFT SHOULDER: ICD-10-CM

## 2023-10-04 PROCEDURE — 97110 THERAPEUTIC EXERCISES: CPT

## 2023-10-06 ENCOUNTER — APPOINTMENT (OUTPATIENT)
Dept: PHYSICAL THERAPY | Facility: REHABILITATION | Age: 53
End: 2023-10-06
Attending: FAMILY MEDICINE
Payer: COMMERCIAL

## 2024-04-14 ENCOUNTER — OFFICE VISIT (OUTPATIENT)
Dept: URGENT CARE | Facility: PHYSICIAN GROUP | Age: 54
End: 2024-04-14
Payer: COMMERCIAL

## 2024-04-14 VITALS
HEART RATE: 82 BPM | DIASTOLIC BLOOD PRESSURE: 68 MMHG | HEIGHT: 65 IN | OXYGEN SATURATION: 93 % | SYSTOLIC BLOOD PRESSURE: 140 MMHG | TEMPERATURE: 97.5 F | WEIGHT: 205.6 LBS | BODY MASS INDEX: 34.26 KG/M2 | RESPIRATION RATE: 16 BRPM

## 2024-04-14 DIAGNOSIS — H66.002 NON-RECURRENT ACUTE SUPPURATIVE OTITIS MEDIA OF LEFT EAR WITHOUT SPONTANEOUS RUPTURE OF TYMPANIC MEMBRANE: ICD-10-CM

## 2024-04-14 DIAGNOSIS — J98.8 BACTERIAL RESPIRATORY INFECTION: ICD-10-CM

## 2024-04-14 DIAGNOSIS — B96.89 BACTERIAL RESPIRATORY INFECTION: ICD-10-CM

## 2024-04-14 DIAGNOSIS — R50.9 FEVER, UNSPECIFIED FEVER CAUSE: ICD-10-CM

## 2024-04-14 LAB
FLUAV RNA SPEC QL NAA+PROBE: NEGATIVE
FLUBV RNA SPEC QL NAA+PROBE: NEGATIVE
RSV RNA SPEC QL NAA+PROBE: NEGATIVE
SARS-COV-2 RNA RESP QL NAA+PROBE: NEGATIVE

## 2024-04-14 PROCEDURE — 99214 OFFICE O/P EST MOD 30 MIN: CPT | Performed by: PHYSICIAN ASSISTANT

## 2024-04-14 PROCEDURE — 3077F SYST BP >= 140 MM HG: CPT | Performed by: PHYSICIAN ASSISTANT

## 2024-04-14 PROCEDURE — 0241U POCT CEPHEID COV-2, FLU A/B, RSV - PCR: CPT | Performed by: PHYSICIAN ASSISTANT

## 2024-04-14 PROCEDURE — 3078F DIAST BP <80 MM HG: CPT | Performed by: PHYSICIAN ASSISTANT

## 2024-04-14 RX ORDER — SCOLOPAMINE TRANSDERMAL SYSTEM 1 MG/1
PATCH, EXTENDED RELEASE TRANSDERMAL
COMMUNITY
Start: 2024-03-06 | End: 2024-06-04

## 2024-04-14 RX ORDER — AMOXICILLIN AND CLAVULANATE POTASSIUM 875; 125 MG/1; MG/1
1 TABLET, FILM COATED ORAL 2 TIMES DAILY
Qty: 14 TABLET | Refills: 0 | Status: SHIPPED | OUTPATIENT
Start: 2024-04-14 | End: 2024-04-21

## 2024-04-14 RX ORDER — ALBUTEROL SULFATE 90 UG/1
2 AEROSOL, METERED RESPIRATORY (INHALATION) EVERY 6 HOURS PRN
Qty: 8.5 G | Refills: 0 | Status: SHIPPED | OUTPATIENT
Start: 2024-04-14

## 2024-04-14 RX ORDER — BENZONATATE 100 MG/1
100 CAPSULE ORAL 3 TIMES DAILY PRN
Qty: 30 CAPSULE | Refills: 0 | Status: SHIPPED | OUTPATIENT
Start: 2024-04-14

## 2024-04-14 ASSESSMENT — ENCOUNTER SYMPTOMS
HEMOPTYSIS: 0
WHEEZING: 0
CHILLS: 0
COUGH: 1
SORE THROAT: 1
MYALGIAS: 1
FEVER: 1
SPUTUM PRODUCTION: 1
SHORTNESS OF BREATH: 0

## 2024-04-14 ASSESSMENT — FIBROSIS 4 INDEX: FIB4 SCORE: 0.66

## 2024-04-14 NOTE — PROGRESS NOTES
Subjective:   Curly Bedolla is a 53 y.o. male who presents today with   Chief Complaint   Patient presents with    Fever     Sore throat,body aches,x5 days     Fever   This is a new problem. Episode onset: 5 days. The problem occurs constantly. The problem has been unchanged. The maximum temperature noted was 101 to 101.9 F. Associated symptoms include congestion, coughing and a sore throat. Pertinent negatives include no chest pain or wheezing. He has tried NSAIDs for the symptoms. The treatment provided mild relief.     Temperature up to 101 last night.     PMH:  has a past medical history of Obesity.  MEDS:   Current Outpatient Medications:     amoxicillin-clavulanate (AUGMENTIN) 875-125 MG Tab, Take 1 Tablet by mouth 2 times a day for 7 days., Disp: 14 Tablet, Rfl: 0    benzonatate (TESSALON) 100 MG Cap, Take 1 Capsule by mouth 3 times a day as needed for Cough., Disp: 30 Capsule, Rfl: 0    albuterol 108 (90 Base) MCG/ACT Aero Soln inhalation aerosol, Inhale 2 Puffs every 6 hours as needed for Shortness of Breath., Disp: 8.5 g, Rfl: 0    rosuvastatin (CRESTOR) 10 MG Tab, Take 1 Tablet by mouth every evening., Disp: 90 Tablet, Rfl: 3    scopolamine (TRANSDERM-SCOP) 1 mg/72hr PATCH 72 HR, 1 patch to skin as needed 24 hours prior to activity use 1 each day for 3 days Transdermal every three days for 30 days (Patient not taking: Reported on 4/14/2024), Disp: , Rfl:     vitamin D2, Ergocalciferol, (DRISDOL) 1.25 MG (94389 UT) Cap capsule, Take 1 Capsule by mouth every 7 days. (Patient not taking: Reported on 4/14/2024), Disp: 12 Capsule, Rfl: 3  ALLERGIES: No Known Allergies  SURGHX: No past surgical history on file.  SOCHX:  reports that he has never smoked. He has never used smokeless tobacco. He reports that he does not currently use alcohol. He reports that he does not use drugs.  FH: Reviewed with patient, not pertinent to this visit.     Review of Systems   Constitutional:  Positive for fever.  "Negative for chills.   HENT:  Positive for congestion and sore throat.    Respiratory:  Positive for cough and sputum production. Negative for hemoptysis, shortness of breath and wheezing.    Cardiovascular:  Negative for chest pain.   Musculoskeletal:  Positive for myalgias.      Objective:   BP (!) 140/68 (BP Location: Right arm, Patient Position: Sitting, BP Cuff Size: Adult long)   Pulse 82   Temp 36.4 °C (97.5 °F) (Temporal)   Resp 16   Ht 1.651 m (5' 5\")   Wt 93.3 kg (205 lb 9.6 oz)   SpO2 93%   BMI 34.21 kg/m²   Physical Exam  Vitals and nursing note reviewed.   Constitutional:       General: He is not in acute distress.     Appearance: Normal appearance. He is well-developed. He is not ill-appearing or toxic-appearing.   HENT:      Head: Normocephalic and atraumatic.      Right Ear: Hearing normal.      Left Ear: Hearing normal.   Cardiovascular:      Rate and Rhythm: Normal rate and regular rhythm.      Heart sounds: Normal heart sounds.   Pulmonary:      Effort: Pulmonary effort is normal. No respiratory distress.      Breath sounds: No stridor. Rhonchi present. No wheezing or rales.      Comments: Congested cough  Musculoskeletal:      Comments: Normal movement in all 4 extremities   Skin:     General: Skin is warm and dry.   Neurological:      Mental Status: He is alert.      Coordination: Coordination normal.   Psychiatric:         Mood and Affect: Mood normal.       COVID -  FLU -  RSV -    Assessment/Plan:   Assessment    1. Fever, unspecified fever cause  - POCT CoV-2, Flu A/B, RSV by PCR    2. Bacterial respiratory infection  - amoxicillin-clavulanate (AUGMENTIN) 875-125 MG Tab; Take 1 Tablet by mouth 2 times a day for 7 days.  Dispense: 14 Tablet; Refill: 0  - benzonatate (TESSALON) 100 MG Cap; Take 1 Capsule by mouth 3 times a day as needed for Cough.  Dispense: 30 Capsule; Refill: 0  - albuterol 108 (90 Base) MCG/ACT Aero Soln inhalation aerosol; Inhale 2 Puffs every 6 hours as needed for " Shortness of Breath.  Dispense: 8.5 g; Refill: 0    3. Non-recurrent acute suppurative otitis media of left ear without spontaneous rupture of tympanic membrane  - amoxicillin-clavulanate (AUGMENTIN) 875-125 MG Tab; Take 1 Tablet by mouth 2 times a day for 7 days.  Dispense: 14 Tablet; Refill: 0    Other orders  - scopolamine (TRANSDERM-SCOP) 1 mg/72hr PATCH 72 HR; 1 patch to skin as needed 24 hours prior to activity use 1 each day for 3 days Transdermal every three days for 30 days (Patient not taking: Reported on 4/14/2024)    Symptoms and presentation appear consistent with left-sided ear infection as well as respiratory tract infection and we will treat accordingly with antibiotics.  No indication for x-ray today.  Recommend use over-the-counter symptomatic treatment per 's instructions such as Mucinex.    Differential diagnosis, natural history, supportive care, and indications for immediate   follow-up discussed.   Patient given instructions and understanding of medications and treatment.    If not improving in 3-5 days, F/U with PCP or return to  if symptoms worsen.    Patient agreeable to plan.      Please note that this dictation was created using voice recognition software. I have made every reasonable attempt to correct obvious errors, but I expect that there are errors of grammar and possibly content that I did not discover before finalizing the note.    Umair Mcintosh PA-C

## 2024-05-13 ENCOUNTER — DOCUMENTATION (OUTPATIENT)
Dept: HEALTH INFORMATION MANAGEMENT | Facility: OTHER | Age: 54
End: 2024-05-13
Payer: COMMERCIAL

## 2025-03-11 ENCOUNTER — OFFICE VISIT (OUTPATIENT)
Dept: MEDICAL GROUP | Facility: LAB | Age: 55
End: 2025-03-11
Payer: COMMERCIAL

## 2025-03-11 VITALS
TEMPERATURE: 97.2 F | WEIGHT: 205.6 LBS | BODY MASS INDEX: 34.26 KG/M2 | HEART RATE: 71 BPM | RESPIRATION RATE: 16 BRPM | OXYGEN SATURATION: 93 % | SYSTOLIC BLOOD PRESSURE: 128 MMHG | DIASTOLIC BLOOD PRESSURE: 80 MMHG | HEIGHT: 65 IN

## 2025-03-11 DIAGNOSIS — E88.810 METABOLIC SYNDROME: ICD-10-CM

## 2025-03-11 DIAGNOSIS — Z13.6 SCREENING FOR CARDIOVASCULAR CONDITION: ICD-10-CM

## 2025-03-11 DIAGNOSIS — Z12.5 PROSTATE CANCER SCREENING: ICD-10-CM

## 2025-03-11 DIAGNOSIS — E78.2 MIXED HYPERLIPIDEMIA: ICD-10-CM

## 2025-03-11 DIAGNOSIS — G89.29 CHRONIC RIGHT SHOULDER PAIN: ICD-10-CM

## 2025-03-11 DIAGNOSIS — Z00.00 ANNUAL PHYSICAL EXAM: ICD-10-CM

## 2025-03-11 DIAGNOSIS — M25.511 CHRONIC RIGHT SHOULDER PAIN: ICD-10-CM

## 2025-03-11 DIAGNOSIS — E55.9 VITAMIN D DEFICIENCY: ICD-10-CM

## 2025-03-11 DIAGNOSIS — R73.03 PREDIABETES: ICD-10-CM

## 2025-03-11 PROBLEM — R03.0 ELEVATED BLOOD PRESSURE READING: Status: RESOLVED | Noted: 2022-08-10 | Resolved: 2025-03-11

## 2025-03-11 PROBLEM — R05.3 CHRONIC COUGH: Status: RESOLVED | Noted: 2023-02-17 | Resolved: 2025-03-11

## 2025-03-11 PROCEDURE — 3079F DIAST BP 80-89 MM HG: CPT | Performed by: STUDENT IN AN ORGANIZED HEALTH CARE EDUCATION/TRAINING PROGRAM

## 2025-03-11 PROCEDURE — 3074F SYST BP LT 130 MM HG: CPT | Performed by: STUDENT IN AN ORGANIZED HEALTH CARE EDUCATION/TRAINING PROGRAM

## 2025-03-11 PROCEDURE — 99214 OFFICE O/P EST MOD 30 MIN: CPT | Performed by: STUDENT IN AN ORGANIZED HEALTH CARE EDUCATION/TRAINING PROGRAM

## 2025-03-11 ASSESSMENT — ENCOUNTER SYMPTOMS
SHORTNESS OF BREATH: 0
CHILLS: 0
FEVER: 0

## 2025-03-11 ASSESSMENT — PATIENT HEALTH QUESTIONNAIRE - PHQ9: CLINICAL INTERPRETATION OF PHQ2 SCORE: 0

## 2025-03-11 ASSESSMENT — FIBROSIS 4 INDEX: FIB4 SCORE: 0.67

## 2025-03-11 NOTE — PROGRESS NOTES
Subjective:     CC:  Diagnoses of Annual physical exam, Prediabetes, Prostate cancer screening, Mixed hyperlipidemia, Vitamin D deficiency, Metabolic syndrome, Chronic right shoulder pain, and Screening for cardiovascular condition were pertinent to this visit.    HISTORY OF THE PRESENT ILLNESS: Patient is a 54 y.o. male with the following medical problems   Past Medical History:   Diagnosis Date    Obesity      . This pleasant patient is here today to establish care and discuss the following;    History of Present Illness         Problem   Metabolic Syndrome   Vitamin D Deficiency   Prediabetes    Last A1c in 2023 was 6.5. Patient started a keto diet 1 month ago      Mixed Hyperlipidemia    Patient is not taking his prescribed rosuvastatin due to concern for side effects      Chronic Right Shoulder Pain    Had 6 weeks of PT which did help. He has pain occasionally with lifting weights      Chronic Cough (Resolved)   Elevated Blood Pressure Reading (Resolved)      Current Outpatient Medications Ordered in Epic   Medication Sig Dispense Refill    benzonatate (TESSALON) 100 MG Cap Take 1 Capsule by mouth 3 times a day as needed for Cough. 30 Capsule 0    albuterol 108 (90 Base) MCG/ACT Aero Soln inhalation aerosol Inhale 2 Puffs every 6 hours as needed for Shortness of Breath. 8.5 g 0    rosuvastatin (CRESTOR) 10 MG Tab Take 1 Tablet by mouth every evening. (Patient not taking: Reported on 3/11/2025) 90 Tablet 3    vitamin D2, Ergocalciferol, (DRISDOL) 1.25 MG (64632 UT) Cap capsule Take 1 Capsule by mouth every 7 days. (Patient not taking: Reported on 3/11/2025) 12 Capsule 3     No current [facility]-ordered facility-administered medications on file.         ROS:   Review of Systems   Constitutional:  Negative for chills and fever.   Respiratory:  Negative for shortness of breath.    Cardiovascular:  Negative for chest pain.         Objective:     Exam: /80 (BP Location: Right arm, Patient Position: Sitting, BP Cuff  "Size: Adult)   Pulse 71   Temp 36.2 °C (97.2 °F) (Temporal)   Resp 16   Ht 1.651 m (5' 5\")   Wt 93.3 kg (205 lb 9.6 oz)   SpO2 93%  Body mass index is 34.21 kg/m².    Physical Exam  Constitutional:       General: He is not in acute distress.     Appearance: He is not ill-appearing.   Pulmonary:      Effort: Pulmonary effort is normal.   Neurological:      Mental Status: He is alert.   Psychiatric:         Mood and Affect: Mood normal.         Behavior: Behavior normal.         Thought Content: Thought content normal.         Judgment: Judgment normal.               Assessment & Plan:     Problem List Items Addressed This Visit       Chronic right shoulder pain    Chronic-stable         Metabolic syndrome    Chronic  -dicussed with importance of cutting down weight          Mixed hyperlipidemia    Chronic-stable  -recheck lipid panel          Prediabetes    Chronic  -check A1c and microalbumin          Relevant Orders    MICROALBUMIN CREAT RATIO URINE    Vitamin D deficiency    Chronic  -recheck vitamin d            Other Visit Diagnoses         Annual physical exam        Relevant Orders    HEMOGLOBIN A1C    CBC WITH DIFFERENTIAL    Comp Metabolic Panel    TSH WITH REFLEX TO FT4    Lipid Profile      Prostate cancer screening        Relevant Orders    PROSTATE SPECIFIC AG SCREENING      Screening for cardiovascular condition        Relevant Orders    CT-CARDIAC SCORING            2 month lab follow up and CT cardiac score     Please note that this dictation was created using voice recognition software. I have made every reasonable attempt to correct obvious errors, but I expect that there are errors of grammar and possibly content that I did not discover before finalizing the note.  "

## 2025-03-24 ENCOUNTER — HOSPITAL ENCOUNTER (OUTPATIENT)
Dept: RADIOLOGY | Facility: MEDICAL CENTER | Age: 55
End: 2025-03-24
Attending: STUDENT IN AN ORGANIZED HEALTH CARE EDUCATION/TRAINING PROGRAM
Payer: COMMERCIAL

## 2025-03-24 DIAGNOSIS — Z13.6 SCREENING FOR CARDIOVASCULAR CONDITION: ICD-10-CM

## 2025-03-24 PROCEDURE — 4410556 CT-CARDIAC SCORING (SELF PAY ONLY)

## 2025-03-27 ENCOUNTER — RESULTS FOLLOW-UP (OUTPATIENT)
Dept: MEDICAL GROUP | Facility: LAB | Age: 55
End: 2025-03-27

## 2025-06-06 ENCOUNTER — HOSPITAL ENCOUNTER (OUTPATIENT)
Dept: LAB | Facility: MEDICAL CENTER | Age: 55
End: 2025-06-06
Attending: STUDENT IN AN ORGANIZED HEALTH CARE EDUCATION/TRAINING PROGRAM
Payer: COMMERCIAL

## 2025-06-06 DIAGNOSIS — R73.03 PREDIABETES: ICD-10-CM

## 2025-06-06 DIAGNOSIS — Z00.00 ANNUAL PHYSICAL EXAM: ICD-10-CM

## 2025-06-06 DIAGNOSIS — Z12.5 PROSTATE CANCER SCREENING: ICD-10-CM

## 2025-06-06 LAB
ALBUMIN SERPL BCP-MCNC: 4.4 G/DL (ref 3.2–4.9)
ALBUMIN/GLOB SERPL: 1.4 G/DL
ALP SERPL-CCNC: 90 U/L (ref 30–99)
ALT SERPL-CCNC: 23 U/L (ref 2–50)
ANION GAP SERPL CALC-SCNC: 13 MMOL/L (ref 7–16)
AST SERPL-CCNC: 19 U/L (ref 12–45)
BASOPHILS # BLD AUTO: 0.7 % (ref 0–1.8)
BASOPHILS # BLD: 0.07 K/UL (ref 0–0.12)
BILIRUB SERPL-MCNC: 0.7 MG/DL (ref 0.1–1.5)
BUN SERPL-MCNC: 13 MG/DL (ref 8–22)
CALCIUM ALBUM COR SERPL-MCNC: 9.1 MG/DL (ref 8.5–10.5)
CALCIUM SERPL-MCNC: 9.4 MG/DL (ref 8.5–10.5)
CHLORIDE SERPL-SCNC: 103 MMOL/L (ref 96–112)
CHOLEST SERPL-MCNC: 219 MG/DL (ref 100–199)
CO2 SERPL-SCNC: 23 MMOL/L (ref 20–33)
CREAT SERPL-MCNC: 0.95 MG/DL (ref 0.5–1.4)
CREAT UR-MCNC: 79.1 MG/DL
EOSINOPHIL # BLD AUTO: 0.33 K/UL (ref 0–0.51)
EOSINOPHIL NFR BLD: 3.1 % (ref 0–6.9)
ERYTHROCYTE [DISTWIDTH] IN BLOOD BY AUTOMATED COUNT: 45.1 FL (ref 35.9–50)
EST. AVERAGE GLUCOSE BLD GHB EST-MCNC: 126 MG/DL
GFR SERPLBLD CREATININE-BSD FMLA CKD-EPI: 94 ML/MIN/1.73 M 2
GLOBULIN SER CALC-MCNC: 3.2 G/DL (ref 1.9–3.5)
GLUCOSE SERPL-MCNC: 107 MG/DL (ref 65–99)
HBA1C MFR BLD: 6 % (ref 4–5.6)
HCT VFR BLD AUTO: 56.4 % (ref 42–52)
HDLC SERPL-MCNC: 31 MG/DL
HGB BLD-MCNC: 18.3 G/DL (ref 14–18)
IMM GRANULOCYTES # BLD AUTO: 0.07 K/UL (ref 0–0.11)
IMM GRANULOCYTES NFR BLD AUTO: 0.7 % (ref 0–0.9)
LDLC SERPL CALC-MCNC: 148 MG/DL
LYMPHOCYTES # BLD AUTO: 3.85 K/UL (ref 1–4.8)
LYMPHOCYTES NFR BLD: 36 % (ref 22–41)
MCH RBC QN AUTO: 28.4 PG (ref 27–33)
MCHC RBC AUTO-ENTMCNC: 32.4 G/DL (ref 32.3–36.5)
MCV RBC AUTO: 87.6 FL (ref 81.4–97.8)
MICROALBUMIN UR-MCNC: 1.3 MG/DL
MICROALBUMIN/CREAT UR: 16 MG/G (ref 0–30)
MONOCYTES # BLD AUTO: 0.65 K/UL (ref 0–0.85)
MONOCYTES NFR BLD AUTO: 6.1 % (ref 0–13.4)
NEUTROPHILS # BLD AUTO: 5.73 K/UL (ref 1.82–7.42)
NEUTROPHILS NFR BLD: 53.4 % (ref 44–72)
NRBC # BLD AUTO: 0 K/UL
NRBC BLD-RTO: 0 /100 WBC (ref 0–0.2)
PLATELET # BLD AUTO: 331 K/UL (ref 164–446)
PMV BLD AUTO: 10 FL (ref 9–12.9)
POTASSIUM SERPL-SCNC: 4.1 MMOL/L (ref 3.6–5.5)
PROT SERPL-MCNC: 7.6 G/DL (ref 6–8.2)
PSA SERPL DL<=0.01 NG/ML-MCNC: 0.75 NG/ML (ref 0–4)
RBC # BLD AUTO: 6.44 M/UL (ref 4.7–6.1)
SODIUM SERPL-SCNC: 139 MMOL/L (ref 135–145)
TRIGL SERPL-MCNC: 200 MG/DL (ref 0–149)
TSH SERPL DL<=0.005 MIU/L-ACNC: 3.41 UIU/ML (ref 0.38–5.33)
WBC # BLD AUTO: 10.7 K/UL (ref 4.8–10.8)

## 2025-06-06 PROCEDURE — 85025 COMPLETE CBC W/AUTO DIFF WBC: CPT

## 2025-06-06 PROCEDURE — 80053 COMPREHEN METABOLIC PANEL: CPT

## 2025-06-06 PROCEDURE — 83036 HEMOGLOBIN GLYCOSYLATED A1C: CPT

## 2025-06-06 PROCEDURE — 82043 UR ALBUMIN QUANTITATIVE: CPT

## 2025-06-06 PROCEDURE — 82570 ASSAY OF URINE CREATININE: CPT

## 2025-06-06 PROCEDURE — 84153 ASSAY OF PSA TOTAL: CPT

## 2025-06-06 PROCEDURE — 84443 ASSAY THYROID STIM HORMONE: CPT

## 2025-06-06 PROCEDURE — 80061 LIPID PANEL: CPT

## 2025-06-06 PROCEDURE — 36415 COLL VENOUS BLD VENIPUNCTURE: CPT

## 2025-06-09 ENCOUNTER — RESULTS FOLLOW-UP (OUTPATIENT)
Dept: MEDICAL GROUP | Facility: LAB | Age: 55
End: 2025-06-09

## 2025-06-12 ENCOUNTER — APPOINTMENT (OUTPATIENT)
Dept: MEDICAL GROUP | Facility: LAB | Age: 55
End: 2025-06-12
Payer: COMMERCIAL

## 2025-06-12 VITALS
WEIGHT: 203.93 LBS | HEIGHT: 65 IN | BODY MASS INDEX: 33.98 KG/M2 | HEART RATE: 84 BPM | OXYGEN SATURATION: 93 % | DIASTOLIC BLOOD PRESSURE: 76 MMHG | SYSTOLIC BLOOD PRESSURE: 126 MMHG | TEMPERATURE: 97.6 F | RESPIRATION RATE: 18 BRPM

## 2025-06-12 DIAGNOSIS — E78.2 MIXED HYPERLIPIDEMIA: ICD-10-CM

## 2025-06-12 DIAGNOSIS — R73.03 PREDIABETES: Primary | ICD-10-CM

## 2025-06-12 PROCEDURE — 3074F SYST BP LT 130 MM HG: CPT | Performed by: STUDENT IN AN ORGANIZED HEALTH CARE EDUCATION/TRAINING PROGRAM

## 2025-06-12 PROCEDURE — 3078F DIAST BP <80 MM HG: CPT | Performed by: STUDENT IN AN ORGANIZED HEALTH CARE EDUCATION/TRAINING PROGRAM

## 2025-06-12 PROCEDURE — 99214 OFFICE O/P EST MOD 30 MIN: CPT | Performed by: STUDENT IN AN ORGANIZED HEALTH CARE EDUCATION/TRAINING PROGRAM

## 2025-06-12 ASSESSMENT — ENCOUNTER SYMPTOMS
CHILLS: 0
SHORTNESS OF BREATH: 0
FEVER: 0

## 2025-06-12 ASSESSMENT — FIBROSIS 4 INDEX: FIB4 SCORE: 0.67

## 2025-06-12 NOTE — PROGRESS NOTES
"Subjective:     CC:   Chief Complaint   Patient presents with    Results        HPI:   History of Present Illness         Problem   Prediabetes    Last A1c in 2023 was 6.5. Patient started a keto diet 1 month ago     6/12/25  -he is eating more whole food   -patient is not consistent with exercising but does use a stationary bike      Mixed Hyperlipidemia    Patient is not taking his prescribed rosuvastatin due to concern for side effects          Current Medications and Prescriptions Ordered in Epic[1]        ROS:  Review of Systems   Constitutional:  Negative for chills and fever.   Respiratory:  Negative for shortness of breath.    Cardiovascular:  Negative for chest pain.     Objective:     Exam:  /76 (BP Location: Right arm, Patient Position: Sitting, BP Cuff Size: Adult)   Pulse 84   Temp 36.4 °C (97.6 °F) (Temporal)   Resp 18   Ht 1.651 m (5' 5\")   Wt 92.5 kg (203 lb 14.8 oz)   SpO2 93%   BMI 33.93 kg/m²  Body mass index is 33.93 kg/m².    Physical Exam  Constitutional:       General: He is not in acute distress.     Appearance: He is not ill-appearing.   Pulmonary:      Effort: Pulmonary effort is normal.   Neurological:      Mental Status: He is alert.   Psychiatric:         Mood and Affect: Mood normal.         Behavior: Behavior normal.         Thought Content: Thought content normal.         Judgment: Judgment normal.           Assessment & Plan:     Problem List Items Addressed This Visit       Mixed hyperlipidemia    Chronic-slightly improved         Prediabetes - Primary    Chronic  -discussed with patients to cut down calories in the evening  -discussed with patient be consistent with his exercise   -may consider Glp-1 at next visit           3 month follow up for blood sugar with POCT a1c        Please note that this dictation was created using voice recognition software. I have made every reasonable attempt to correct obvious errors, but I expect that there are errors of grammar and " possibly content that I did not discover before finalizing the note.          [1]   Current Outpatient Medications Ordered in Epic   Medication Sig Dispense Refill    albuterol 108 (90 Base) MCG/ACT Aero Soln inhalation aerosol Inhale 2 Puffs every 6 hours as needed for Shortness of Breath. (Patient not taking: Reported on 6/12/2025) 8.5 g 0     No current Epic-ordered facility-administered medications on file.

## 2025-06-12 NOTE — ASSESSMENT & PLAN NOTE
Chronic  -discussed with patients to cut down calories in the evening  -discussed with patient be consistent with his exercise   -may consider Glp-1 at next visit